# Patient Record
Sex: MALE | Race: OTHER | NOT HISPANIC OR LATINO | Employment: OTHER | URBAN - METROPOLITAN AREA
[De-identification: names, ages, dates, MRNs, and addresses within clinical notes are randomized per-mention and may not be internally consistent; named-entity substitution may affect disease eponyms.]

---

## 2017-02-09 ENCOUNTER — OFFICE VISIT (OUTPATIENT)
Dept: MEDICAL GROUP | Facility: PHYSICIAN GROUP | Age: 72
End: 2017-02-09
Payer: MEDICARE

## 2017-02-09 VITALS
BODY MASS INDEX: 28.53 KG/M2 | HEIGHT: 63 IN | DIASTOLIC BLOOD PRESSURE: 80 MMHG | HEART RATE: 88 BPM | SYSTOLIC BLOOD PRESSURE: 140 MMHG | TEMPERATURE: 98.4 F | WEIGHT: 161 LBS | OXYGEN SATURATION: 90 %

## 2017-02-09 DIAGNOSIS — R53.83 OTHER FATIGUE: ICD-10-CM

## 2017-02-09 DIAGNOSIS — I25.2 OLD MYOCARDIAL INFARCTION: ICD-10-CM

## 2017-02-09 DIAGNOSIS — L21.9 SEBORRHEIC DERMATITIS: ICD-10-CM

## 2017-02-09 DIAGNOSIS — K80.20 ASYMPTOMATIC GALLSTONES: ICD-10-CM

## 2017-02-09 DIAGNOSIS — D45 POLYCYTHEMIA RUBRA VERA (HCC): ICD-10-CM

## 2017-02-09 DIAGNOSIS — K76.0 FATTY LIVER: ICD-10-CM

## 2017-02-09 DIAGNOSIS — R91.1 PULMONARY NODULE SEEN ON IMAGING STUDY: ICD-10-CM

## 2017-02-09 DIAGNOSIS — Z00.00 MEDICARE ANNUAL WELLNESS VISIT, SUBSEQUENT: ICD-10-CM

## 2017-02-09 DIAGNOSIS — I11.9 BENIGN HYPERTENSIVE HEART DISEASE WITHOUT HEART FAILURE: ICD-10-CM

## 2017-02-09 DIAGNOSIS — I10 ESSENTIAL HYPERTENSION: Chronic | ICD-10-CM

## 2017-02-09 DIAGNOSIS — R94.31: ICD-10-CM

## 2017-02-09 DIAGNOSIS — K63.5 COLON POLYPS: ICD-10-CM

## 2017-02-09 PROCEDURE — 1036F TOBACCO NON-USER: CPT | Performed by: NURSE PRACTITIONER

## 2017-02-09 PROCEDURE — G0439 PPPS, SUBSEQ VISIT: HCPCS | Performed by: NURSE PRACTITIONER

## 2017-02-09 PROCEDURE — G8510 SCR DEP NEG, NO PLAN REQD: HCPCS | Performed by: NURSE PRACTITIONER

## 2017-02-09 ASSESSMENT — PATIENT HEALTH QUESTIONNAIRE - PHQ9: CLINICAL INTERPRETATION OF PHQ2 SCORE: 2

## 2017-02-09 NOTE — ASSESSMENT & PLAN NOTE
1/2016    AST(SGOT) 18 12 - 45 U/L Final      ALT(SGPT) 28 2 - 50 U/L Final     Alkaline Phosphatase 50 30 - 99 U/L Final     Followed by Herminio Hernandez M.D..

## 2017-02-09 NOTE — PROGRESS NOTES
CC:   Medicare Annual Wellness Visit    HPI:  Alonso is a 72 y.o. here for Medicare Annual Wellness Visit    Patient Active Problem List    Diagnosis Date Noted   • Chronic ST-segment elevation, probably repolarization variant 05/20/2014     Priority: Low   • Old myocardial infarction, presumed vasospastic with no coronary arterial obstruction, 2002 05/20/2014     Priority: Low   • Other fatigue 10/12/2015   • Pulmonary nodule seen on imaging study 01/07/2015   • Polycythemia rubra vera (CMS-HCC) 01/07/2015   • Benign hypertensive heart disease without heart failure 05/20/2014   • Seborrheic dermatitis 04/16/2014   • Dementia 04/16/2014   • Hypertension 04/16/2014   • Hyperlipidemia 04/16/2014   • Fatty liver 04/16/2014   • Colon polyps 04/16/2014   • Asymptomatic gallstones 04/16/2014   • Diverticulosis 04/16/2014     Current Outpatient Prescriptions   Medication Sig Dispense Refill   • Calcium-Vitamin D (CALTRATE 600 PLUS-VIT D PO) Take  by mouth.     • colchicine (COLCRYS) 0.6 MG Tab Take 1 Tab by mouth every day. 9 Tab 1   • Multiple Vitamins-Minerals (CENTRUM SILVER ADULT 50+ PO) Take  by mouth.     • ketoconazole (NIZORAL) 2 % Cream Use BID 60 g 1   • vitamin e (VITAMIN E) 400 UNIT Cap Take 400 Units by mouth every day.     • Cholecalciferol (VITAMIN D PO) Take  by mouth.     • lisinopril (PRINIVIL) 20 MG TABS Take 20 mg by mouth every day.     • amlodipine (NORVASC) 10 MG TABS Take 0.5 Tabs by mouth every day. 30 Tab 11   • lovastatin (MEVACOR) 20 MG TABS Take 1 Tab by mouth every day. 30 Tab 3   • aspirin (ASA) 325 MG TABS Take 325 mg by mouth every 6 hours as needed.       No current facility-administered medications for this visit.      Current supplements: no  Chronic narcotic pain medicines: no  Allergies: Vytorin  Exercise: yes  Current social contact/activities: Has support of family and friends      Screening:  Depression Screening    Little interest or pleasure in doing things?     Feeling down,  depressed , or hopeless?    Trouble falling or staying asleep, or sleeping too much?     Feeling tired or having little energy?     Poor appetite or overeating?     Feeling bad about yourself - or that you are a failure or have let yourself or your family down?    Trouble concentrating on things, such as reading the newspaper or watching television?    Moving or speaking so slowly that other people could have noticed.  Or the opposite - being so fidgety or restless that you have been moving around a lot more than usual?     Thoughts that you would be better off dead, or of hurting yourself?     Patient Health Questionnaire Score:      If depressive symptoms identified deferred to follow up visit unless specifically addressed in assesment and plan.      Screening for Cognitive Impairment    Three Minute Recall (banana, sunrise, fence)   /3    Draw clock face with all 12 numbers set to the hand to show 10 minures past 11 o'clock       Cognitive concerns identified defferred for follow up unless specifically addressed in assesment and plan.    Fall Risk Assessment    Has the patient had two or more falls in the last year or any fall with injury in the last year?       Safety Assessment    Throw rugs on floor.     Handrails on all stairs.     Good lighting in all hallways.     Difficulty hearing.     Patient counseled about all safety risks that were identified.    Functional Assessment ADLs    Are there any barriers preventing you from cooking for yourself or meeting nutritional needs?   .    Are there any barriers preventing you from driving safely or obtaining transportation?   .    Are there any barriers preventing you from using a telephone or calling for help?   .    Are there any barriers preventing you from shopping?   .    Are there any barriers preventing you from taking care of your own finances?   .    Are there any barriers preventing you from managing your medications?   .    Are currently engaing any  "exercise or physical activity?   .       Health Maintenance Summary                IMM ZOSTER VACCINE Overdue 1/21/2006     IMM PNEUMOCOCCAL 65+ (ADULT) LOW/MEDIUM RISK SERIES Overdue 1/21/2011      Done 10/10/2008 Imm Admin: Pneumococcal polysaccharide vaccine (PPSV-23)    Annual Wellness Visit Overdue 8/14/2015      Done 8/13/2014     IMM DTaP/Tdap/Td Vaccine Next Due 4/28/2024      Done 4/28/2014 Imm Admin: Tdap Vaccine    COLONOSCOPY Next Due 5/20/2024      Done 5/20/2014 AMB REFERRAL TO GI FOR COLONOSCOPY          Patient Care Team:  Herminio Hernandez M.D. as PCP - General (Family Medicine)  SUSAN Rajput as Consulting Physician (Family Medicine)  [facility] ([facility]) as Consulting Physician  Lester Lima M.D. as Consulting Physician (Gastroenterology)  [facility] as Consulting Physician        Social History   Substance Use Topics   • Smoking status: Former Smoker -- 1.50 packs/day for 45 years     Types: Cigarettes     Quit date: 04/09/2002   • Smokeless tobacco: Never Used   • Alcohol Use: 0.0 oz/week     0 Standard drinks or equivalent per week      Comment: occasional beer       Family History   Problem Relation Age of Onset   • Hypertension Mother    • Hypertension Father      He  has a past medical history of Hypertension; CAD (coronary artery disease); Dementia; MI, old; GI bleed; CAD (coronary artery disease); Diverticulitis; and Hyperlipidemia.   History reviewed. No pertinent past surgical history.    ROS:    Ostomy or other tubes or amputations: no  Chronic oxygen use no  Last eye exam 2016  : Denies incontinence.  Gait: Uses no assistive device Hearing good.    Dentition good    Exam: Blood pressure 140/80, pulse 88, temperature 36.9 °C (98.4 °F), height 1.6 m (5' 2.99\"), weight 73.029 kg (161 lb), SpO2 90 %. Body mass index is 28.53 kg/(m^2).  Alert, oriented in no acute distress.  Eye contact is good, speech goal directed, affect " calm      Assessment and Plan. The following treatment and monitoring plan is recommended for all problems as listed below:   Hypertension  /80  Chronic condition managed with current medical regimen  Stable per review   Continue with current meds  Followed by Herminio Hernandez M.D..        Hyperlipidemia  Chronic condition managed with current medical regimen  Stable per review   Continue with current meds  Followed by Herminio Hernandez M.D..        Seborrheic dermatitis  Chronic condition managed with current medical regimen  Stable per review   Continue with current meds prn  Followed by Herminio Hernandez M.D..        Fatty liver  1/2016    AST(SGOT) 18 12 - 45 U/L Final      ALT(SGPT) 28 2 - 50 U/L Final     Alkaline Phosphatase 50 30 - 99 U/L Final     Followed by Herminio Hernandez M.D..     Colon polyps  Colonoscopy done 4/2015, no polyps, has hemorrhoids with hx of 3 prior bandings    Asymptomatic gallstones  Chronic condition managed with current medical regimen  Stable per review  Denies abd pain, n/v  Followed by GI and Herminio Hernandez M.D..        Diverticulosis  Chronic condition managed with current medical regimen  Stable per review  Denies abd pain  Followed by GI and Herminio Hernandez M.D..        Benign hypertensive heart disease without heart failure  Followed by Hemrinio Hernandez M.D. q 6 months  Denies cardiac sxs  Continue with current medications     Pulmonary nodule seen on imaging study  Chronic condition managed with current medical regimen  Stable per review  No changes   Followed by Herminio Hernandez M.D..        Polycythemia rubra vera  Chronic condition managed with current medical regimen of blood donation q 3 months  Stable per review  Followed by Herminio Hernandez M.D..        Chronic ST-segment elevation, probably repolarization variant  Followed by Herminio Hernandez M.D.  q 6 months  Denies cardiac sxs  Continue with current medications      Old myocardial  infarction, presumed vasospastic with no coronary arterial obstruction, 2002  Followed by Herminio Hernandez M.D.  q 6 months  Denies cardiac sxs  Continue with current medications     Dementia  Per wife, occurrence following cardiac event when pt sustained low O2  Unchanged, pt no longer drives, not allowed to cook, home alone for short time  Non verbal today by choice    Other fatigue  Per wife sleeps most of day  Tires easily with activity, not so in the past, wife will begin to take pt for walks when weather improves        Health Care Screening recommendations reviewed with patient today: per Patient Instructions  DPA/Advanced directive: .has NO advanced directive - not interested in additional information    Next office visit for recheck of chronic medical conditions is due with Herminio Hernandez M.D. in 4-6 months    States pneumonia vaccines received

## 2017-02-09 NOTE — ASSESSMENT & PLAN NOTE
Per wife sleeps most of day  Tires easily with activity, not so in the past, wife will begin to take pt for walks when weather improves

## 2017-02-09 NOTE — ASSESSMENT & PLAN NOTE
Followed by Herminio Hernandez M.D.  q 6 months  Denies cardiac sxs  Continue with current medications

## 2017-02-09 NOTE — ASSESSMENT & PLAN NOTE
Chronic condition managed with current medical regimen  Stable per review  Denies abd pain  Followed by [facility] and Herminio Hernandez M.D..

## 2017-02-09 NOTE — ASSESSMENT & PLAN NOTE
Chronic condition managed with current medical regimen of blood donation q 3 months  Stable per review  Followed by Herminio Hernandez M.D..

## 2017-02-09 NOTE — ASSESSMENT & PLAN NOTE
/80  Chronic condition managed with current medical regimen  Stable per review   Continue with current meds  Followed by Herminio Hernandez M.D..

## 2017-02-09 NOTE — ASSESSMENT & PLAN NOTE
Chronic condition managed with current medical regimen  Stable per review   Continue with current meds prn  Followed by Herminio Hernandez M.D..

## 2017-02-09 NOTE — ASSESSMENT & PLAN NOTE
Per wife, occurrence following cardiac event when pt sustained low O2  Unchanged, pt no longer drives, not allowed to cook, home alone for short time  Non verbal today by choice

## 2017-02-09 NOTE — ASSESSMENT & PLAN NOTE
Chronic condition managed with current medical regimen  Stable per review  Denies abd pain, n/v  Followed by [facility] and Herminio Hernandez M.D..

## 2017-02-09 NOTE — ASSESSMENT & PLAN NOTE
Chronic condition managed with current medical regimen  Stable per review   Continue with current meds  Followed by Herminio Hernandez M.D..

## 2017-02-09 NOTE — PATIENT INSTRUCTIONS
Continue with care through Herminio Hernandez M.D..  Next Medicare Annual Wellness Visit is due in one year.    Continue care with specialists you are seeing for your chronic problems.    Attached is some preventative information for you to read.          Fall Prevention and Home Safety  Falls cause injuries and can affect all age groups. It is possible to prevent falls.   HOW TO PREVENT FALLS  · Wear shoes with rubber soles that do not have an opening for your toes.   · Keep the inside and outside of your house well lit.   · Use night lights throughout your home.   · Remove clutter from floors.   · Clean up floor spills.   · Remove throw rugs or fasten them to the floor with carpet tape.   · Do not place electrical cords across pathways.   · Put grab bars by your tub, shower, and toilet. Do not use towel bars as grab bars.   · Put handrails on both sides of the stairway. Fix loose handrails.   · Do not climb on stools or stepladders, if possible.   · Do not wax your floors.   · Repair uneven or unsafe sidewalks, walkways, or stairs.   · Keep items you use a lot within reach.   · Be aware of pets.   · Keep emergency numbers next to the telephone.   · Put smoke detectors in your home and near bedrooms.   Ask your doctor what other things you can do to prevent falls.  Document Released: 10/14/2010 Document Revised: 06/18/2013 Document Reviewed: 03/19/2013  ExitCare® Patient Information ©2013 [email or web address].    Exercise to Stay Healthy      Exercise helps you become and stay healthy.  EXERCISE IDEAS AND TIPS  Choose exercises that:  · You enjoy.   · Fit into your day.   You do not need to exercise really hard to be healthy. You can do exercises at a slow or medium level and stay healthy. You can:  · Stretch before and after working out.   · Try yoga, Pilates, or ramesh chi.   · Lift weights.   · Walk fast, swim, jog, run, climb stairs, bicycle, dance, or rollerskate.   · Take aerobic classes.   Exercises that burn about  150 calories:  · Running 1 ½ miles in 15 minutes.   · Playing volleyball for 45 to 60 minutes.   · Washing and waxing a car for 45 to 60 minutes.   · Playing touch football for 45 minutes.   · Walking 1 ¾ miles in 35 minutes.   · Pushing a stroller 1 ½ miles in 30 minutes.   · Playing basketball for 30 minutes.   · Raking leaves for 30 minutes.   · Bicycling 5 miles in 30 minutes.   · Walking 2 miles in 30 minutes.   · Dancing for 30 minutes.   · Shoveling snow for 15 minutes.   · Swimming laps for 20 minutes.   · Walking up stairs for 15 minutes.   · Bicycling 4 miles in 15 minutes.   · Gardening for 30 to 45 minutes.   · Jumping rope for 15 minutes.   · Washing windows or floors for 45 to 60 minutes.     One suggestion is to start walking for 10 minutes after each meal.  This will help with digestion and help you to metabolize your meal.       For Weight Loss -   Recommend portion sizes with more fruits/vegetables/high fiber foods.  Stay away from white bread/pastas/white rice/white potatoes.  Increase Fluid intake to 6-8 glasses of water daily.   Eliminate soda's (diet and regular) from your fluid intake.      Document Released: 01/20/2012 Document Revised: 03/11/2013 Document Reviewed: 01/20/2012  ExitCare® Patient Information ©2013 [employer].    Fat and Cholesterol Control Diet  Cholesterol is a wax-like substance. It comes from your liver and is found in certain foods. There is good (HDL) and bad (LDL) cholesterol. Too much cholesterol in your blood can affect your heart. Certain foods can lower or raise your cholesterol. Eat foods that are low in cholesterol.  Saturated and trans fats are bad fats found in foods that will raise your cholesterol. Do not eat foods that are high in saturated and trans fats.  FOODS HIGHER IN SATURATED AND TRANS FATS  · Dairy products, such as whole milk, eggs, cheese, cream, and butter.   · Fatty meats, such as hot dogs, sausage, and salami.   · Fried foods.   · Trans fats  which are found in margarine and pre-made cookies, crackers, and baked goods.   · Tropical oils, such as coconut and palm oils.   Read package labels at the store. Do not buy products that use saturated or trans fats or hydrogenated oils. Find foods labeled:  · Low-fat.   · Low-saturated fat.   · Trans-fat-free.   · Low-cholesterol.   FOODS LOWER IN CHOLESTEROL  ·  Fruit.   · Vegetables.   · Beans, peas, and lentils.   · Fish.   · Lean meat, such as chicken (without skin) or ground turkey.   · Grains, such as barley, rice, couscous, bulgur wheat, and pasta.   · Heart-healthy tub margarine.   PREPARING YOUR FOOD  · Broil, bake, steam, or roast foods. Do not anand food.   · Use non-stick cooking sprays.   · Use lemon or herbs to flavor food instead of using butter or stick margarine.   · Use nonfat yogurt, salsa, or low-fat dressings for salads.   Document Released: 06/18/2013 Document Reviewed: 06/18/2013  ExitCare® Patient Information ©2013 [employer].    Recommend annual flu vaccine.  Next due in Fall, 2015.  If you decide not to have the flu vaccine, recommend good handwashing and staying out of crowds during flu season.

## 2017-02-09 NOTE — MR AVS SNAPSHOT
"        Alonso Chang    2017 3:00 PM   Office Visit   MRN: [id number]    Department:  [facility]   Dept Phone:  [phone]    Description:  Male : 1945   Provider:  AGUS Kiran           Reason for Visit     Annual Exam subsequent      Allergies as of 2017     Allergen Noted Reactions    Vytorin 2015   Unspecified    [facility] Vytorin      You were diagnosed with     Essential hypertension   [7803663]       Seborrheic dermatitis   [1117700]       Fatty liver   [077454]       Colon polyps   [956620]       Asymptomatic gallstones   [511318]       Benign hypertensive heart disease without heart failure   [402.10.ICD-9-CM]       Pulmonary nodule seen on imaging study   [506220]       Polycythemia rubra vera (CMS-HCC)   [163539]       ST-segment elevation   [607240]       Old myocardial infarction   [412.ICD-9-CM]         Vital Signs     Blood Pressure Pulse Temperature Height Weight Body Mass Index    164/102 mmHg 109 36.9 °C (98.4 °F) 1.6 m (5' 2.99\") 73.029 kg (161 lb) 28.53 kg/m2    Oxygen Saturation Smoking Status                90% Former Smoker          Basic Information     Date Of Birth Sex Race Ethnicity Preferred Language    1945 Male , Other Non- English      Problem List              ICD-10-CM Priority Class Noted - Resolved    Seborrheic dermatitis L21.9   2014 - Present    Dementia (Chronic) F03.90   2014 - Present    Hypertension (Chronic) I10   2014 - Present    Hyperlipidemia (Chronic) E78.5   2014 - Present    Fatty liver K76.0   2014 - Present    Colon polyps K63.5   2014 - Present    Asymptomatic gallstones K80.20   2014 - Present    Diverticulosis K57.90   2014 - Present    Chronic ST-segment elevation, probably repolarization variant R94.31 Low  2014 - Present    Old myocardial infarction, presumed vasospastic with no coronary arterial obstruction,  I25.2 Low  " 5/20/2014 - Present    Benign hypertensive heart disease without heart failure I11.9   5/20/2014 - Present    Pulmonary nodule seen on imaging study R91.1   1/7/2015 - Present    Polycythemia rubra vera (CMS-[facility]) D45   1/7/2015 - Present    Other fatigue R53.83   10/12/2015 - Present      Health Maintenance        Date Due Completion Dates    IMM ZOSTER VACCINE 1/21/2006 ---    IMM PNEUMOCOCCAL 65+ (ADULT) LOW/MEDIUM RISK SERIES (1 of 2 - PCV13) 1/21/2011 10/10/2008    IMM DTaP/Tdap/Td Vaccine (2 - Td) 4/28/2024 4/28/2014    COLONOSCOPY 5/20/2024 5/20/2014            Current Immunizations     INFLUENZA VACCINE H1N1 10/20/2013, 10/20/2013    Influenza TIV (IM) 12/2/2015, 12/5/2014    Influenza Vaccine Adult HD 11/9/2016, 11/9/2016    Pneumococcal Vaccine (UF)Historical Data 10/20/2011, 10/20/2011    Pneumococcal polysaccharide vaccine (PPSV-23) 10/10/2008    Tdap Vaccine 4/28/2014      Below and/or attached are the medications your provider expects you to take. Review all of your home medications and newly ordered medications with your provider and/or pharmacist. Follow medication instructions as directed by your provider and/or pharmacist. Please keep your medication list with you and share with your provider. Update the information when medications are discontinued, doses are changed, or new medications (including over-the-counter products) are added; and carry medication information at all times in the event of emergency situations     Allergies:  VYTORIN - Unspecified               Medications  Valid as of: February 09, 2017 -  3:06 PM    Generic Name Brand Name Tablet Size Instructions for use    AmLODIPine Besylate (Tab) NORVASC 10 MG Take 0.5 Tabs by mouth every day.        Aspirin (Tab)  MG Take 325 mg by mouth every 6 hours as needed.        Cholecalciferol   Take  by mouth.        Colchicine (Tab) COLCRYS 0.6 MG Take 1 Tab by mouth every day.        Ketoconazole (Cream) NIZORAL 2 % Use BID         Lisinopril (Tab) PRINIVIL 20 MG Take 20 mg by mouth every day.        Lovastatin (Tab) MEVACOR 20 MG Take 1 Tab by mouth every day.        MethylPREDNISolone (Tablet Therapy Pack) MEDROL DOSEPAK 4 MG U.D.        Multiple Vitamins-Minerals   Take  by mouth.        Vitamin E (Cap) VITAMIN E 400 UNIT Take 400 Units by mouth every day.        .                 Medicines prescribed today were sent to:     [address] (), [place] - [address]    [address] () [place] [address]    Phone: [phone] Fax: [phone]    Open 24 Hours?: No      Medication refill instructions:       If your prescription bottle indicates you have medication refills left, it is not necessary to call your provider’s office. Please contact your pharmacy and they will refill your medication.    If your prescription bottle indicates you do not have any refills left, you may request refills at any time through one of the following ways: The online [email or web address] system (except Urgent Care), by calling your provider’s office, or by asking your pharmacy to contact your provider’s office with a refill request. Medication refills are processed only during regular business hours and may not be available until the next business day. Your provider may request additional information or to have a follow-up visit with you prior to refilling your medication.   *Please Note: Medication refills are assigned a new Rx number when refilled electronically. Your pharmacy may indicate that no refills were authorized even though a new prescription for the same medication is available at the pharmacy. Please request the medicine by name with the pharmacy before contacting your provider for a refill.        Instructions    Continue with care through Herminio Hernandez M.D..  Next Medicare Annual Wellness Visit is due in one year.    Continue care with specialists you are seeing for your chronic problems.    Attached is some preventative  information for you to read.          Fall Prevention and Home Safety  Falls cause injuries and can affect all age groups. It is possible to prevent falls.   HOW TO PREVENT FALLS  · Wear shoes with rubber soles that do not have an opening for your toes.   · Keep the inside and outside of your house well lit.   · Use night lights throughout your home.   · Remove clutter from floors.   · Clean up floor spills.   · Remove throw rugs or fasten them to the floor with carpet tape.   · Do not place electrical cords across pathways.   · Put grab bars by your tub, shower, and toilet. Do not use towel bars as grab bars.   · Put handrails on both sides of the stairway. Fix loose handrails.   · Do not climb on stools or stepladders, if possible.   · Do not wax your floors.   · Repair uneven or unsafe sidewalks, walkways, or stairs.   · Keep items you use a lot within reach.   · Be aware of pets.   · Keep emergency numbers next to the telephone.   · Put smoke detectors in your home and near bedrooms.   Ask your doctor what other things you can do to prevent falls.  Document Released: 10/14/2010 Document Revised: 06/18/2013 Document Reviewed: 03/19/2013  ExitCare® Patient Information ©2013 [employer].    Exercise to Stay Healthy      Exercise helps you become and stay healthy.  EXERCISE IDEAS AND TIPS  Choose exercises that:  · You enjoy.   · Fit into your day.   You do not need to exercise really hard to be healthy. You can do exercises at a slow or medium level and stay healthy. You can:  · Stretch before and after working out.   · Try yoga, Pilates, or ramesh chi.   · Lift weights.   · Walk fast, swim, jog, run, climb stairs, bicycle, dance, or rollerskate.   · Take aerobic classes.   Exercises that burn about 150 calories:  · Running 1 ½ miles in 15 minutes.   · Playing volleyball for 45 to 60 minutes.   · Washing and waxing a car for 45 to 60 minutes.   · Playing touch football for 45 minutes.   · Walking 1 ¾ miles in 35  minutes.   · Pushing a stroller 1 ½ miles in 30 minutes.   · Playing basketball for 30 minutes.   · Raking leaves for 30 minutes.   · Bicycling 5 miles in 30 minutes.   · Walking 2 miles in 30 minutes.   · Dancing for 30 minutes.   · Shoveling snow for 15 minutes.   · Swimming laps for 20 minutes.   · Walking up stairs for 15 minutes.   · Bicycling 4 miles in 15 minutes.   · Gardening for 30 to 45 minutes.   · Jumping rope for 15 minutes.   · Washing windows or floors for 45 to 60 minutes.     One suggestion is to start walking for 10 minutes after each meal.  This will help with digestion and help you to metabolize your meal.       For Weight Loss -   Recommend portion sizes with more fruits/vegetables/high fiber foods.  Stay away from white bread/pastas/white rice/white potatoes.  Increase Fluid intake to 6-8 glasses of water daily.   Eliminate soda's (diet and regular) from your fluid intake.      Document Released: 01/20/2012 Document Revised: 03/11/2013 Document Reviewed: 01/20/2012  ExitCare® Patient Information ©2013 [employer].    Fat and Cholesterol Control Diet  Cholesterol is a wax-like substance. It comes from your liver and is found in certain foods. There is good (HDL) and bad (LDL) cholesterol. Too much cholesterol in your blood can affect your heart. Certain foods can lower or raise your cholesterol. Eat foods that are low in cholesterol.  Saturated and trans fats are bad fats found in foods that will raise your cholesterol. Do not eat foods that are high in saturated and trans fats.  FOODS HIGHER IN SATURATED AND TRANS FATS  · Dairy products, such as whole milk, eggs, cheese, cream, and butter.   · Fatty meats, such as hot dogs, sausage, and salami.   · Fried foods.   · Trans fats which are found in margarine and pre-made cookies, crackers, and baked goods.   · Tropical oils, such as coconut and palm oils.   Read package labels at the store. Do not buy products that use saturated or trans fats or  hydrogenated oils. Find foods labeled:  · Low-fat.   · Low-saturated fat.   · Trans-fat-free.   · Low-cholesterol.   FOODS LOWER IN CHOLESTEROL  ·  Fruit.   · Vegetables.   · Beans, peas, and lentils.   · Fish.   · Lean meat, such as chicken (without skin) or ground turkey.   · Grains, such as barley, rice, couscous, bulgur wheat, and pasta.   · Heart-healthy tub margarine.   PREPARING YOUR FOOD  · Broil, bake, steam, or roast foods. Do not anand food.   · Use non-stick cooking sprays.   · Use lemon or herbs to flavor food instead of using butter or stick margarine.   · Use nonfat yogurt, salsa, or low-fat dressings for salads.   Document Released: 06/18/2013 Document Reviewed: 06/18/2013  ExitCare® Patient Information ©2013 [employer].    Recommend annual flu vaccine.  Next due in Fall, 2015.  If you decide not to have the flu vaccine, recommend good handwashing and staying out of crowds during flu season.                  MyChart Status: Patient Declined

## 2017-02-09 NOTE — ASSESSMENT & PLAN NOTE
Chronic condition managed with current medical regimen  Stable per review  No changes   Followed by Herminio Hernandez M.D..

## 2017-10-25 ENCOUNTER — OFFICE VISIT (OUTPATIENT)
Dept: MEDICAL GROUP | Facility: PHYSICIAN GROUP | Age: 72
End: 2017-10-25
Payer: MEDICARE

## 2017-10-25 VITALS
DIASTOLIC BLOOD PRESSURE: 78 MMHG | SYSTOLIC BLOOD PRESSURE: 148 MMHG | TEMPERATURE: 98 F | RESPIRATION RATE: 18 BRPM | HEART RATE: 97 BPM | HEIGHT: 63 IN | OXYGEN SATURATION: 90 % | BODY MASS INDEX: 28 KG/M2 | WEIGHT: 158 LBS

## 2017-10-25 DIAGNOSIS — E78.00 PURE HYPERCHOLESTEROLEMIA: ICD-10-CM

## 2017-10-25 DIAGNOSIS — I10 ESSENTIAL HYPERTENSION: ICD-10-CM

## 2017-10-25 DIAGNOSIS — I25.2 OLD MYOCARDIAL INFARCTION: ICD-10-CM

## 2017-10-25 DIAGNOSIS — D45 POLYCYTHEMIA RUBRA VERA (HCC): ICD-10-CM

## 2017-10-25 PROCEDURE — 99214 OFFICE O/P EST MOD 30 MIN: CPT | Performed by: FAMILY MEDICINE

## 2017-10-25 ASSESSMENT — PAIN SCALES - GENERAL: PAINLEVEL: NO PAIN

## 2017-10-25 NOTE — LETTER
[facility]  Lilo Luu M.D.  [address]  [place] [address]  Fax: [phone]   Authorization for Release/Disclosure of   Protected Health Information   Name: ARTEMIO CHANG JR. : 1945 SSN: [id number]   Address: [facility] [address]  [place] [address] Phone:    [phone] (home)    I authorize the entity listed below to release/disclose the PHI below to:   [facility]/Lilo Luu M.D. and Lilo Luu M.D.   Provider or Entity Name:  Dr. Wilkinson  [facility]   Address   City, [facility]   Phone:      Fax:     Reason for request: continuity of care   Information to be released:    [  ] LAST COLONOSCOPY,  including any PATH REPORT and follow-up  [  ] LAST FIT/COLOGUARD RESULT [  ] LAST DEXA  [  ] LAST MAMMOGRAM  [  ] LAST PAP  [  ] LAST LABS [  ] RETINA EXAM REPORT  [  ] IMMUNIZATION RECORDS  [ x ] Release all info: Last two office notes      [  ] Check here and initial the line next to each item to release ALL health information INCLUDING  _____ Care and treatment for drug and / or alcohol abuse  _____ HIV testing, infection status, or AIDS  _____ Genetic Testing    DATES OF SERVICE OR TIME PERIOD TO BE DISCLOSED: _____________  I understand and acknowledge that:  * This Authorization may be revoked at any time by you in writing, except if your health information has already been used or disclosed.  * Your health information that will be used or disclosed as a result of you signing this authorization could be re-disclosed by the recipient. If this occurs, your re-disclosed health information may no longer be protected by State or Federal laws.  * You may refuse to sign this Authorization. Your refusal will not affect your ability to obtain treatment.  * This Authorization becomes effective upon signing and will  on (date) __________.      If no date is indicated, this Authorization will  one (1) year from the signature date.    Name: Artemio Chang  .    Signature:   Date:     10/25/2017       PLEASE FAX REQUESTED RECORDS BACK TO: [phone]

## 2017-10-26 NOTE — PROGRESS NOTES
Alonso Chang Jr. is a 72 y.o. male here to establish care and discuss blood pressure and oxygen saturation.    HPI:  Alonso is a pleasant 72-year-old male here to establish care. He is accompanied by his wife, Johanny. He was born in the [facility] and is a [employer] .    Polycythemia rubra vera  This is a chronic and stable issue for the patient. [facility] Has therapeutic phlebotomy every 8-12 weeks. His oxygen saturation is borderline low today at 90%. Patient's wife tells me that he has been evaluated for oxygen need, but as they live in a studio apartment and the kitchen is close by, it would be unsafe.    Essential hypertension  Stable. Monitoring BP at home. Patient's wife tells me his blood pressure was elevated at the GI office. Currently taking lisinopril 20mg daily and amlodipine 5mg daily as directed. Also taking baby aspirin. Denies lightheadedness, vision changes, headache, palpitations or leg swelling.    Pure hypercholesterolemia  Doing well. Reviewed labs with the patient. Taking medications as prescribed. No myalgias.    Old myocardial infarction, presumed vasospastic with no coronary arterial obstruction, 2002  Patient has known history of previous heart attack. He denies any cardiac symptoms today. Taking medications as prescribed without side effects.      Current medicines (including changes today)  Current Outpatient Prescriptions   Medication Sig Dispense Refill   • Calcium-Vitamin D (CALTRATE 600 PLUS-VIT D PO) Take  by mouth.     • colchicine (COLCRYS) 0.6 MG Tab Take 1 Tab by mouth every day. 9 Tab 1   • Multiple Vitamins-Minerals (CENTRUM SILVER ADULT 50+ PO) Take  by mouth.     • ketoconazole (NIZORAL) 2 % Cream Use BID 60 g 1   • vitamin e (VITAMIN E) 400 UNIT Cap Take 400 Units by mouth every day.     • Cholecalciferol (VITAMIN D PO) Take  by mouth.     • lisinopril (PRINIVIL) 20 MG TABS Take 20 mg by mouth every day.     • amlodipine (NORVASC) 10  "MG TABS Take 0.5 Tabs by mouth every day. 30 Tab 11   • lovastatin (MEVACOR) 20 MG TABS Take 1 Tab by mouth every day. 30 Tab 3   • aspirin (ASA) 325 MG TABS Take 325 mg by mouth every 6 hours as needed.       No current facility-administered medications for this visit.      He  has a past medical history of CAD (coronary artery disease); CAD (coronary artery disease); Dementia; Diverticulitis; GI bleed; Hyperlipidemia; Hypertension; and MI, old.  He  has no past surgical history on file.  Social History   Substance Use Topics   • Smoking status: Former Smoker     Packs/day: 1.50     Years: 45.00     Types: Cigarettes     Quit date: 2002   • Smokeless tobacco: Never Used   • Alcohol use 0.0 oz/week      Comment: occasional beer     Social History     Social History Narrative    ** Merged History Encounter **          Family History   Problem Relation Age of Onset   • Hypertension Mother    • Hypertension Father      Family Status   Relation Status   • Mother  at age 65   • Father  at age 102   • Brother  at age 55       ROS  Constitutional: Negative for fever, chills and malaise/fatigue.   HENT: Negative for congestion.    Eyes: Negative for pain.   Respiratory: Negative for cough and shortness of breath.    Cardiovascular: Negative for leg swelling.   Gastrointestinal: Negative for nausea, vomiting, abdominal pain and diarrhea.   Genitourinary: Negative for dysuria and hematuria.   Skin: Negative for rash.   Neurological: Negative for dizziness, focal weakness and headaches.   Endo/Heme/Allergies: Does not bruise/bleed easily.   Psychiatric/Behavioral: Negative for depression.  The patient is not nervous/anxious.       Objective:     Physical Exam:  Blood pressure 148/78, pulse 97, temperature 36.7 °C (98 °F), resp. rate 18, height 1.6 m (5' 3\"), weight 71.7 kg (158 lb), SpO2 90 %. Body mass index is 27.99 kg/m².  Constitutional: Alert, no distress. Quiet and reserved.  Skin: Warm, dry, " good turgor, no rashes in visible areas.  Eye: Equal, round and reactive, conjunctiva clear, lids normal.  ENMT: Lips without lesions, good dentition, oropharynx clear.  Neck: Trachea midline, no masses, no thyromegaly.  Respiratory: Unlabored respiratory effort, lungs clear to auscultation, no wheezes, no ronchi.  Cardiovascular: Normal S1, S2, no murmur, no edema.  Abdomen: Soft, non-tender, no masses, no hepatosplenomegaly.  Psych: Alert and oriented x3, normal affect and mood.    Assessment and Plan:     1. Polycythemia rubra vera (CMS-HCC)  Chronic and stable. [facility]. He is not a candidate for home O2 therapy as it would be unsafe. Treating with regular therapeutic phlebotomy.    2. Essential hypertension  Well-controlled. Labs as indicated. Continue antihypertensive medications. Discussed decreasing salt intake. Emphasized benefits of exercise and diet. Continue to monitor.    3. Pure hypercholesterolemia  Well controlled. Labs as indicated. Continue statin medication. Continue to monitor.    4. Old myocardial infarction, presumed vasospastic with no coronary arterial obstruction, 2002  Per patient history. Continue current medications. Monitor.    Records reviewed in Epic.  Followup: Return in about 5 months (around 3/25/2018) for Medicare AWV, short.         PLEASE NOTE: This dictation was created using voice recognition software. I have made every reasonable attempt to correct obvious errors, but I expect that there are errors of grammar and possibly content that I did not discover before finalizing the note.

## 2017-10-26 NOTE — ASSESSMENT & PLAN NOTE
Stable. Monitoring BP at home. Currently taking lisinopril 20mg daily and amlodipine 5mg daily as directed. Also taking baby aspirin. Denies lightheadedness, vision changes, headache, palpitations or leg swelling.

## 2017-10-26 NOTE — ASSESSMENT & PLAN NOTE
Patient has known history of previous heart attack. He denies any cardiac symptoms today. Taking medications as prescribed without side effects.

## 2018-03-30 ENCOUNTER — APPOINTMENT (OUTPATIENT)
Dept: MEDICAL GROUP | Facility: PHYSICIAN GROUP | Age: 73
End: 2018-03-30
Payer: MEDICARE

## 2018-04-04 ENCOUNTER — OFFICE VISIT (OUTPATIENT)
Dept: MEDICAL GROUP | Facility: PHYSICIAN GROUP | Age: 73
End: 2018-04-04
Payer: MEDICARE

## 2018-04-04 VITALS
OXYGEN SATURATION: 97 % | HEIGHT: 63 IN | RESPIRATION RATE: 12 BRPM | HEART RATE: 92 BPM | BODY MASS INDEX: 27.96 KG/M2 | WEIGHT: 157.8 LBS | DIASTOLIC BLOOD PRESSURE: 84 MMHG | SYSTOLIC BLOOD PRESSURE: 142 MMHG | TEMPERATURE: 98.9 F

## 2018-04-04 DIAGNOSIS — D45 POLYCYTHEMIA RUBRA VERA (HCC): ICD-10-CM

## 2018-04-04 DIAGNOSIS — I10 ESSENTIAL HYPERTENSION: ICD-10-CM

## 2018-04-04 DIAGNOSIS — J30.1 ACUTE SEASONAL ALLERGIC RHINITIS DUE TO POLLEN: ICD-10-CM

## 2018-04-04 DIAGNOSIS — F03.90 DEMENTIA WITHOUT BEHAVIORAL DISTURBANCE, UNSPECIFIED DEMENTIA TYPE: Chronic | ICD-10-CM

## 2018-04-04 DIAGNOSIS — E78.00 PURE HYPERCHOLESTEROLEMIA: ICD-10-CM

## 2018-04-04 PROCEDURE — 99214 OFFICE O/P EST MOD 30 MIN: CPT | Performed by: FAMILY MEDICINE

## 2018-04-04 ASSESSMENT — PAIN SCALES - GENERAL: PAINLEVEL: NO PAIN

## 2018-04-04 ASSESSMENT — PATIENT HEALTH QUESTIONNAIRE - PHQ9: CLINICAL INTERPRETATION OF PHQ2 SCORE: 0

## 2018-04-04 NOTE — ASSESSMENT & PLAN NOTE
Elevated today. Monitoring BP at home. Currently taking lisinopril 20mg daily and amlodipine 5mg daily as directed. [facility]. Also taking baby aspirin. Denies lightheadedness, vision changes, headache, palpitations or leg swelling.

## 2018-04-04 NOTE — PROGRESS NOTES
Subjective:   Alonso Chang Jr. is a 73 y.o. male here today for follow-up hypertension, PV and dementia. He is accompanied by his wife (Zina).    Essential hypertension  Elevated today. Monitoring BP at home. Currently taking lisinopril 20mg daily and amlodipine 5mg daily as directed. [facility]. Also taking baby aspirin. Denies lightheadedness, vision changes, headache, palpitations or leg swelling.    Polycythemia rubra vera  Recently stopped phlebotomy due to iron loss. [facility].    Pure hypercholesterolemia  Doing well. Reviewed labs with the patient. Taking medications as prescribed. No myalgias.    Dementia  No recent change. He is not currently on any medications.     Towards the end of the visit, patient's wife reported that Alonso has been coughing and clearing his throat a lot. This has been going on for the last couple of weeks. She mentions that he has [facility] that [facility]. They're currently seeing if he needs supplemental oxygen. He denies any fevers, shortness of breath, wheezing or leg swelling.    Current medicines (including changes today)  Current Outpatient Prescriptions   Medication Sig Dispense Refill   • Calcium-Vitamin D (CALTRATE 600 PLUS-VIT D PO) Take  by mouth.     • colchicine (COLCRYS) 0.6 MG Tab Take 1 Tab by mouth every day. 9 Tab 1   • Multiple Vitamins-Minerals (CENTRUM SILVER ADULT 50+ PO) Take  by mouth.     • ketoconazole (NIZORAL) 2 % Cream Use BID 60 g 1   • vitamin e (VITAMIN E) 400 UNIT Cap Take 400 Units by mouth every day.     • Cholecalciferol (VITAMIN D PO) Take  by mouth.     • lisinopril (PRINIVIL) 20 MG TABS Take 20 mg by mouth every day.     • amlodipine (NORVASC) 10 MG TABS Take 0.5 Tabs by mouth every day. 30 Tab 11   • lovastatin (MEVACOR) 20 MG TABS Take 1 Tab by mouth every day. 30 Tab 3   • aspirin (ASA) 325 MG TABS Take 325 mg by mouth every 6 hours as  "needed.       No current facility-administered medications for this visit.      He  has a past medical history of CAD (coronary artery disease); CAD (coronary artery disease); Dementia; Diverticulitis; GI bleed; Hyperlipidemia; Hypertension; and MI, old.    ROS   No chest pain, no shortness of breath, no abdominal pain.     Objective:     Physical Exam:  Blood pressure 142/84, pulse 92, temperature 37.2 °C (98.9 °F), resp. rate 12, height 1.6 m (5' 3\"), weight 71.6 kg (157 lb 12.8 oz), SpO2 97 %. Body mass index is 27.95 kg/m².   Constitutional: Alert, elderly male in no distress.  Skin: Warm, dry, good turgor, no rashes in visible areas.  Eye: Equal, round and reactive, conjunctiva clear, lids normal.  ENMT: Erythematous and edematous nasal turbinates, lips without lesions, good dentition, oropharynx with cobblestoning and post-nasal drip.  Neck: Trachea midline, no masses, no thyromegaly. No cervical or supraclavicular lymphadenopathy.  Respiratory: Unlabored respiratory effort, lungs clear to auscultation, no wheezes, no ronchi.  Cardiovascular: Normal S1, S2, no murmur, no edema.  Abdomen: Soft, non-tender, no masses, no hepatosplenomegaly.  Psych: Alert and oriented x3, flat affect.    Assessment and Plan:     1. Essential hypertension  Elevated today. I recommended increasing one of patient's antihypertensive medications. Patient's wife declines [facility] with the [facility] in regards to this. Encouraged him to continue taking ambulatory blood pressure measurements.    2. Polycythemia rubra vera (CMS-HCC)  Chronic and stable. Patient has stopped having therapeutic phlebotomy due to low iron. He is [facility] up with [facility].    3. Pure hypercholesterolemia  Chronic and stable. Continue current medications. Following with [facility].    4. Dementia without behavioral disturbance, unspecified dementia type  Chronic and stable. Patient's medications at this time. His wife is his " primary caregiver. We'll continue to monitor.    5. Acute seasonal allergic rhinitis due to pollen  This is a relatively new issue for the patient. Advised nasal saline and steroid sprays daily. OTC anti-histamines (Zyrtec) as needed. Encouraged allergen avoidence and environment modification when possible. If regular use not effective, may consider referral to allergist for immunotherapy.    Followup: Return in about 6 months (around 10/4/2018) for Medicare Annual Wellness with health .         PLEASE NOTE: This dictation was created using voice recognition software. I have made every reasonable attempt to correct obvious errors, but I expect that there are errors of grammar and possibly content that I did not discover before finalizing the note.

## 2018-04-18 ENCOUNTER — OFFICE VISIT (OUTPATIENT)
Dept: URGENT CARE | Facility: CLINIC | Age: 73
End: 2018-04-18
Payer: MEDICARE

## 2018-04-18 VITALS
TEMPERATURE: 98.7 F | SYSTOLIC BLOOD PRESSURE: 162 MMHG | RESPIRATION RATE: 20 BRPM | HEIGHT: 64 IN | WEIGHT: 162.48 LBS | DIASTOLIC BLOOD PRESSURE: 98 MMHG | HEART RATE: 102 BPM | BODY MASS INDEX: 27.74 KG/M2 | OXYGEN SATURATION: 88 %

## 2018-04-18 DIAGNOSIS — I10 HYPERTENSION, UNSPECIFIED TYPE: ICD-10-CM

## 2018-04-18 PROCEDURE — 99214 OFFICE O/P EST MOD 30 MIN: CPT | Performed by: PHYSICIAN ASSISTANT

## 2018-04-18 ASSESSMENT — ENCOUNTER SYMPTOMS
EYES NEGATIVE: 1
HYPERTENSION: 1
CARDIOVASCULAR NEGATIVE: 1
CONSTITUTIONAL NEGATIVE: 1
SHORTNESS OF BREATH: 0
RESPIRATORY NEGATIVE: 1
NEUROLOGICAL NEGATIVE: 1

## 2018-10-22 ENCOUNTER — TELEPHONE (OUTPATIENT)
Dept: MEDICAL GROUP | Facility: PHYSICIAN GROUP | Age: 73
End: 2018-10-22

## 2018-10-22 NOTE — TELEPHONE ENCOUNTER
Left message for patient to call back regarding pre-visit planning. Please transfer call to Aubree at [phone]      Pt was seen on 11/02/18

## 2018-11-02 ENCOUNTER — OFFICE VISIT (OUTPATIENT)
Dept: MEDICAL GROUP | Facility: PHYSICIAN GROUP | Age: 73
End: 2018-11-02
Payer: MEDICARE

## 2018-11-02 VITALS
HEIGHT: 64 IN | DIASTOLIC BLOOD PRESSURE: 88 MMHG | TEMPERATURE: 98.1 F | WEIGHT: 165 LBS | BODY MASS INDEX: 28.17 KG/M2 | RESPIRATION RATE: 18 BRPM | OXYGEN SATURATION: 90 % | SYSTOLIC BLOOD PRESSURE: 152 MMHG | HEART RATE: 74 BPM

## 2018-11-02 DIAGNOSIS — J30.1 ACUTE SEASONAL ALLERGIC RHINITIS DUE TO POLLEN: ICD-10-CM

## 2018-11-02 DIAGNOSIS — Z00.00 MEDICARE ANNUAL WELLNESS VISIT, SUBSEQUENT: ICD-10-CM

## 2018-11-02 DIAGNOSIS — K57.30 DIVERTICULOSIS OF LARGE INTESTINE WITHOUT HEMORRHAGE: ICD-10-CM

## 2018-11-02 DIAGNOSIS — R91.1 PULMONARY NODULE SEEN ON IMAGING STUDY: ICD-10-CM

## 2018-11-02 DIAGNOSIS — L21.9 SEBORRHEIC DERMATITIS: ICD-10-CM

## 2018-11-02 DIAGNOSIS — H00.012 HORDEOLUM EXTERNUM OF RIGHT LOWER EYELID: ICD-10-CM

## 2018-11-02 DIAGNOSIS — D45 POLYCYTHEMIA RUBRA VERA (HCC): ICD-10-CM

## 2018-11-02 DIAGNOSIS — J44.9 COPD, SEVERITY TO BE DETERMINED (HCC): ICD-10-CM

## 2018-11-02 DIAGNOSIS — F03.90 DEMENTIA WITHOUT BEHAVIORAL DISTURBANCE, UNSPECIFIED DEMENTIA TYPE: Chronic | ICD-10-CM

## 2018-11-02 DIAGNOSIS — I25.2 OLD MYOCARDIAL INFARCTION: ICD-10-CM

## 2018-11-02 DIAGNOSIS — I10 ESSENTIAL HYPERTENSION: ICD-10-CM

## 2018-11-02 DIAGNOSIS — G47.33 OBSTRUCTIVE SLEEP APNEA: ICD-10-CM

## 2018-11-02 DIAGNOSIS — E78.00 PURE HYPERCHOLESTEROLEMIA: ICD-10-CM

## 2018-11-02 DIAGNOSIS — I11.9 BENIGN HYPERTENSIVE HEART DISEASE WITHOUT HEART FAILURE: ICD-10-CM

## 2018-11-02 DIAGNOSIS — J96.11 CHRONIC RESPIRATORY FAILURE WITH HYPOXIA (HCC): ICD-10-CM

## 2018-11-02 PROCEDURE — G0439 PPPS, SUBSEQ VISIT: HCPCS | Performed by: FAMILY MEDICINE

## 2018-11-02 RX ORDER — BUDESONIDE AND FORMOTEROL FUMARATE DIHYDRATE 160; 4.5 UG/1; UG/1
2 AEROSOL RESPIRATORY (INHALATION) 2 TIMES DAILY
COMMUNITY
End: 2022-05-10

## 2018-11-02 RX ORDER — ALBUTEROL SULFATE 90 UG/1
2 AEROSOL, METERED RESPIRATORY (INHALATION) EVERY 6 HOURS PRN
COMMUNITY
End: 2022-10-21

## 2018-11-02 ASSESSMENT — ACTIVITIES OF DAILY LIVING (ADL): BATHING_REQUIRES_ASSISTANCE: 0

## 2018-11-02 ASSESSMENT — PATIENT HEALTH QUESTIONNAIRE - PHQ9: CLINICAL INTERPRETATION OF PHQ2 SCORE: 0

## 2018-11-02 ASSESSMENT — ENCOUNTER SYMPTOMS: GENERAL WELL-BEING: FAIR

## 2018-11-02 ASSESSMENT — PAIN SCALES - GENERAL: PAINLEVEL: NO PAIN

## 2018-11-02 NOTE — PROGRESS NOTES
"Chief Complaint   Patient presents with   • Annual Wellness Visit       HPI:  Alonso is a 73 y.o. here for Medicare Annual Wellness Visit.  He is accompanied by his wife, Johanny.  His blood pressure is elevated today.  His wife also asks about a \"stye\" on his right eyelid.      Patient Active Problem List    Diagnosis Date Noted   • Old myocardial infarction, presumed vasospastic with no coronary arterial obstruction, 2002 05/20/2014     Priority: Low   • Acute seasonal allergic rhinitis due to pollen 04/04/2018   • Pulmonary nodule seen on imaging study 01/07/2015   • Polycythemia rubra vera (HCC) 01/07/2015   • Benign hypertensive heart disease without heart failure 05/20/2014   • Seborrheic dermatitis 04/16/2014   • Dementia 04/16/2014   • Essential hypertension 04/16/2014   • Pure hypercholesterolemia 04/16/2014   • Diverticulosis 04/16/2014       Current Outpatient Prescriptions   Medication Sig Dispense Refill   • Calcium-Vitamin D (CALTRATE 600 PLUS-VIT D PO) Take  by mouth.     • colchicine (COLCRYS) 0.6 MG Tab Take 1 Tab by mouth every day. 9 Tab 1   • Multiple Vitamins-Minerals (CENTRUM SILVER ADULT 50+ PO) Take  by mouth.     • vitamin e (VITAMIN E) 400 UNIT Cap Take 400 Units by mouth every day.     • Cholecalciferol (VITAMIN D PO) Take  by mouth.     • lisinopril (PRINIVIL) 20 MG TABS Take 20 mg by mouth every day.     • amlodipine (NORVASC) 10 MG TABS Take 0.5 Tabs by mouth every day. 30 Tab 11   • lovastatin (MEVACOR) 20 MG TABS Take 1 Tab by mouth every day. 30 Tab 3   • aspirin (ASA) 325 MG TABS Take 325 mg by mouth every 6 hours as needed.     • ketoconazole (NIZORAL) 2 % Cream Use BID 60 g 1     No current facility-administered medications for this visit.         Patient is taking medications as noted in medication list.  Current supplements as per medication list.     Allergies: Vytorin    Current social contact/activities: Watching TV, spends time with wife     Is patient current with " immunizations? No, due for FLU and SHINGRIX (Shingles). Patient is interested in receiving NONE today.    He  reports that he quit smoking about 16 years ago. His smoking use included Cigarettes. He has a 67.50 pack-year smoking history. He has never used smokeless tobacco. He reports that he drinks alcohol. He reports that he does not use drugs.  Counseling given: Not Answered    DPA/Advanced directive: Patient does not have an Advanced Directive.  A packet and workshop information was given on Advanced Directives.    ROS:    Gait: Uses no assistive device   Ostomy: No   Other tubes: No   Amputations: No   Chronic oxygen use Yes   Last eye exam oct 2018   Wears hearing aids: No   : Denies any urinary leakage during the last 6 months    Depression Screening  Little interest or pleasure in doing things?  0 - not at all  Feeling down, depressed, or hopeless? 0 - not at all  Patient Health Questionnaire Score: 0    If depressive symptoms identified deferred to follow up visit unless specifically addressed in assessment and plan.    Interpretation of PHQ-9 Total Score   Score Severity   1-4 No Depression   5-9 Mild Depression   10-14 Moderate Depression   15-19 Moderately Severe Depression   20-27 Severe Depression    Screening for Cognitive Impairment  Three Minute Recall (leader, season, table)  0/3    Delemr clock face with all 12 numbers and set the hands to show 10 past 11.  Yes 5/5  If cognitive concerns identified, deferred for follow up unless specifically addressed in assessment and plan.    Fall Risk Assessment  Has the patient had two or more falls in the last year or any fall with injury in the last year?  No  If fall risk identified, deferred for follow up unless specifically addressed in assessment and plan.    Safety Assessment  Throw rugs on floor.  No  Handrails on all stairs.  Yes  Good lighting in all hallways.  Yes  Difficulty hearing.  No  Patient counseled about all safety risks that were  identified.    Functional Assessment ADLs  Are there any barriers preventing you from cooking for yourself or meeting nutritional needs?  Yes. Wife cook, shopping, medications, finances   Are there any barriers preventing you from driving safely or obtaining transportation?  Yes. Wife cook, shopping, medications, finances   Are there any barriers preventing you from using a telephone or calling for help?  No.    Are there any barriers preventing you from shopping?  Yes. Wife cook, shopping, medications, finances   Are there any barriers preventing you from taking care of your own finances?  Yes. Wife cook, shopping, medications, finances   Are there any barriers preventing you from managing your medications?  Yes. Wife cook, shopping, medications, finances   Are there any barriers preventing you from showering, bathing or dressing yourself?  No.    Are you currently engaging in any exercise or physical activity?  No.     What is your perception of your health?  Fair.    Health Maintenance Summary                Annual Wellness Visit Overdue 2/10/2018      Done 2/9/2017 Visit Dx: Medicare annual wellness visit, subsequent     Patient has more history with this topic...    IMM INFLUENZA Overdue 9/1/2018      Done 11/9/2016 Imm Admin: Influenza Vaccine Adult HD     Patient has more history with this topic...    IMM ZOSTER VACCINES Postponed 11/9/2018 Originally 1/21/1996. System: vaccine not available, other system reasons    IMM PNEUMOCOCCAL 65+ (ADULT) LOW/MEDIUM RISK SERIES Next Due 11/9/2018      Done 11/9/2017 Imm Admin: Pneumococcal Conjugate Vaccine (Prevnar/PCV-13)     Patient has more history with this topic...    IMM DTaP/Tdap/Td Vaccine Next Due 4/28/2024      Done 4/28/2014 Imm Admin: Tdap Vaccine    COLONOSCOPY Next Due 5/20/2024      Done 5/20/2014 AMB REFERRAL TO GI FOR COLONOSCOPY          Patient Care Team:  Lilo Luu M.D. as PCP - General (Family Medicine)  SUSAN Rajput as Consulting  "Physician (Family Medicine)  [facility] ([facility]) as Consulting Physician  Lester Lima M.D. as Consulting Physician (Gastroenterology)  [facility] as Consulting Physician    Social History   Substance Use Topics   • Smoking status: Former Smoker     Packs/day: 1.50     Years: 45.00     Types: Cigarettes     Quit date: 4/9/2002   • Smokeless tobacco: Never Used   • Alcohol use 0.0 oz/week      Comment: occasional beer     Family History   Problem Relation Age of Onset   • Hypertension Mother    • Hypertension Father      He  has a past medical history of CAD (coronary artery disease); CAD (coronary artery disease); Dementia; Diverticulitis; GI bleed; Hyperlipidemia; Hypertension; and MI, old.   History reviewed. No pertinent surgical history.    Exam:     Blood pressure 156/92, pulse 74, temperature 36.7 °C (98.1 °F), resp. rate 18, height 1.626 m (5' 4\"), weight 74.8 kg (165 lb), SpO2 90 %. Body mass index is 28.32 kg/m².    Hearing fair.    Dentition fair  Alert, oriented in no acute distress.  Eye contact is good, speech goal directed, affect calm.   On right lower eyelid there is a small erythematous papule.    Assessment and Plan. The following treatment and monitoring plan is recommended:    1. Medicare annual wellness visit, subsequent  HRA reviewed and abnormal.  Patient's previous medical history, healthcare maintenance and immunization status reviewed.  See discussion of anticipatory guidance and individual problems below.  Patient will return annually for Medicare annual well visit.    2. Old myocardial infarction, presumed vasospastic with no coronary arterial obstruction, 2002  Chronic and stable.  On medical management.  [facility]  Continue to monitor.   3. Benign hypertensive heart disease without heart failure  See above.   4. Essential hypertension  Elevated on recheck.  Patient's wife reports his blood pressures " [facility].  They plan to follow-up with his specialists there for medication adjustment.  Continue to monitor.   5. Pure hypercholesterolemia  See above.   6. Polycythemia rubra vera (HCC)  Chronic and stable.  [facility]  Continue to monitor.   7. Pulmonary nodule seen on imaging study  Chronic and stable.  [facility].  Serial CTs ordered to follow.   8. Diverticulosis of large intestine without hemorrhage  Chronic and stable.  Patient is asymptomatic.  Following with GI specialist as needed.   9. Acute seasonal allergic rhinitis due to pollen  Chronic and stable.  Continue use of OTC allergy medications and nasal sprays.  Continue to monitor.   10. Dementia without behavioral disturbance, unspecified dementia type  Chronic and stable.  Patient lives with his wife who is his primary caregiver.  Continue to monitor.   11. Seborrheic dermatitis  Chronic and stable.  Continue topical medications as needed and monitor.   12. Chronic respiratory failure with hypoxia (HCC)  This is a new diagnosis to me today.  Patient was recently started on Symbicort, CPAP and supplemental oxygen.  Following with pulmonology.  Continue to monitor.   13. Obstructive sleep apnea  See above.   14. COPD, severity to be determined (HCC)  See above.   15. Hordeolum externum of right lower eyelid  This is a new diagnosis.  Advised warm compresses.  Instructed patient to call if worsening or not improving.     Services suggested: No services needed at this time  Health Care Screening recommendations as per orders if indicated.  Referrals offered: PT/OT/Nutrition counseling/Behavioral Health/Smoking cessation as per orders if indicated.    Discussion today about general wellness and lifestyle habits:    · Prevent falls and reduce trip hazards; Cautioned about securing or removing rugs.  · Have a working fire alarm and carbon monoxide detector;   · Engage in regular physical  activity and social activities     Follow-up: Return in about 6 months (around 5/2/2019) for routine follow-up, short.

## 2019-04-10 ENCOUNTER — OFFICE VISIT (OUTPATIENT)
Dept: MEDICAL GROUP | Facility: PHYSICIAN GROUP | Age: 74
End: 2019-04-10
Payer: MEDICARE

## 2019-04-10 VITALS
HEART RATE: 99 BPM | HEIGHT: 64 IN | TEMPERATURE: 98.3 F | DIASTOLIC BLOOD PRESSURE: 72 MMHG | WEIGHT: 165 LBS | OXYGEN SATURATION: 92 % | SYSTOLIC BLOOD PRESSURE: 164 MMHG | RESPIRATION RATE: 14 BRPM | BODY MASS INDEX: 28.17 KG/M2

## 2019-04-10 DIAGNOSIS — J96.11 CHRONIC RESPIRATORY FAILURE WITH HYPOXIA (HCC): ICD-10-CM

## 2019-04-10 DIAGNOSIS — I10 ESSENTIAL HYPERTENSION: ICD-10-CM

## 2019-04-10 DIAGNOSIS — G47.33 OBSTRUCTIVE SLEEP APNEA: ICD-10-CM

## 2019-04-10 PROCEDURE — 99214 OFFICE O/P EST MOD 30 MIN: CPT | Performed by: FAMILY MEDICINE

## 2019-04-10 RX ORDER — LISINOPRIL 30 MG/1
30 TABLET ORAL DAILY
Qty: 30 TAB | Refills: 0 | Status: SHIPPED | OUTPATIENT
Start: 2019-04-10 | End: 2019-05-10 | Stop reason: SDUPTHER

## 2019-04-10 RX ORDER — VITAMIN E 200 UNIT
CAPSULE ORAL
COMMUNITY
End: 2022-05-10

## 2019-04-10 ASSESSMENT — PATIENT HEALTH QUESTIONNAIRE - PHQ9: CLINICAL INTERPRETATION OF PHQ2 SCORE: 0

## 2019-04-10 NOTE — PROGRESS NOTES
Subjective:   Alonso Chang Jr. is a 74 y.o. male here today for DMV paperwork. He is accompanied by his wife Johanny, today.     Arthritis  The patient's wife states that they are currently in the process of applying for a handicap permit. They are here to complete the necessary paperwork. No recent falls or injuries.    Essential hypertension  The patient is hypertensive in office today at 164/72. He does have a history of being hypertensive during his visits here as his last blood pressure was 156/92. Patient is currently taking amlodipine 10 mg and lisinopril 20 mg. [facility] but has not had any recent medication changes by them. Denies lightheadedness, vision changes, headache, palpitations or leg swelling.    Obstructive sleep apnea  His wife is concerned that his snoring has been worsening at night. He does have a history of sleep apnea with use of oxygen at night, although he does not use a C-pap. Patient does not experience any allergies, but he does report a recent cough with associated sputum production. [facility].    Current medicines (including changes today)  Current Outpatient Prescriptions   Medication Sig Dispense Refill   • docusate sodium (COLACE) 50 MG Cap Take 50 mg by mouth 2 times a day.     • MEGARED OMEGA-3 KRILL  MG Cap Take  by mouth.     • lisinopril (PRINIVIL, ZESTRIL) 30 MG tablet Take 1 Tab by mouth every day. 30 Tab 0   • budesonide-formoterol (SYMBICORT) 160-4.5 MCG/ACT Aerosol Inhale 2 Puffs by mouth 2 Times a Day.     • Lifitegrast (XIIDRA) 5 % Solution by Ophthalmic route.     • Calcium-Vitamin D (CALTRATE 600 PLUS-VIT D PO) Take  by mouth.     • Multiple Vitamins-Minerals (CENTRUM SILVER ADULT 50+ PO) Take  by mouth.     • Cholecalciferol (VITAMIN D PO) Take  by mouth.     • amlodipine (NORVASC) 10 MG TABS Take 0.5 Tabs by mouth every day. 30 Tab 11   • lovastatin (MEVACOR) 20 MG TABS Take 1 Tab by mouth  "every day. 30 Tab 3   • aspirin (ASA) 325 MG TABS Take 325 mg by mouth every 6 hours as needed.     • albuterol 108 (90 Base) MCG/ACT Aero Soln inhalation aerosol Inhale 2 Puffs by mouth every 6 hours as needed for Shortness of Breath.     • colchicine (COLCRYS) 0.6 MG Tab Take 1 Tab by mouth every day. 9 Tab 1   • ketoconazole (NIZORAL) 2 % Cream Use BID 60 g 1     No current facility-administered medications for this visit.      He  has a past medical history of CAD (coronary artery disease); CAD (coronary artery disease); Dementia; Diverticulitis; GI bleed; Hyperlipidemia; Hypertension; and MI, old.    ROS   No chest pain, no shortness of breath, no abdominal pain.     Objective:     Physical Exam:  BP (!) 164/72 (BP Location: Left arm, Patient Position: Sitting, BP Cuff Size: Adult)   Pulse 99   Temp 36.8 °C (98.3 °F) (Temporal)   Resp 14   Ht 1.626 m (5' 4\")   Wt 74.8 kg (165 lb)   SpO2 92%  Body mass index is 28.32 kg/m².   Constitutional: Alert, no distress, non-toxic.  Skin: Warm, dry, good turgor, no rashes in visible areas.  Eye: Equal, round and reactive, conjunctiva clear, lids normal.  ENMT: Lips without lesions, good dentition, oropharynx clear.  Neck: Trachea midline, no masses, no thyromegaly.   Respiratory: Unlabored respiratory effort, lungs clear to auscultation, no wheezes, no rhonchi.  Cardiovascular: Normal S1, S2, no murmur, no edema.  Psych: Alert and oriented x3, normal affect and mood.    Assessment and Plan:     1. Essential hypertension  This is a chronic problem for the patient that is worsening. He is currently taking amlodipine 10 mg and lisinopril 20 mg daily. Due to persistent elevation in the patient's blood pressure, medication changes will be made. [facility] is [facility]. Continue to monitor.    - lisinopril (PRINIVIL, ZESTRIL) 30 MG tablet; Take 1 Tab by mouth every day.  Dispense: 30 Tab; Refill: 0    2. Obstructive sleep apnea  3. Chronic respiratory " failure with hypoxia (HCC)  This is a chronic diagnosis for the patient. His snoring has been worsening. He is not currently using CPAP, but does use supplemental oxygen. [facility]. Continue to monitor.     Followup: 1 month          Rafiq FISHMAN (Scribe), am scribing for, and in the presence of, Lilo Luu MD    Electronically signed by: Rafiq Mo (Scribe), 4/10/2019    ILilo MD personally performed the services described in this documentation, as scribed by Rafiq Mo in my presence, and it is both accurate and complete.

## 2019-04-12 DIAGNOSIS — I15.9 SECONDARY HYPERTENSION: ICD-10-CM

## 2019-04-12 RX ORDER — AMLODIPINE BESYLATE 10 MG/1
5 TABLET ORAL DAILY
Qty: 30 TAB | Refills: 11 | OUTPATIENT
Start: 2019-04-12

## 2019-04-12 NOTE — TELEPHONE ENCOUNTER
Phone Number Called: [phone] (home)     Message: I spoke with patient's wife and she states they do not need this refilled and patient takes amlodipine 5 mg QD [facility].     Left Message for patient to call back: no

## 2019-04-29 NOTE — ASSESSMENT & PLAN NOTE
This is a chronic and stable issue for the patient. [facility] is [facility]. Has therapeutic phlebotomy every 8-12 weeks. His oxygen saturation is borderline low today at 90%. Patient's wife tells me that he has been evaluated for oxygen need, but as they live in a studio apartment, it would be unsafe.   <<----- Click to add NO pertinent Past Medical History No pertinent past medical history

## 2019-05-10 ENCOUNTER — OFFICE VISIT (OUTPATIENT)
Dept: MEDICAL GROUP | Facility: PHYSICIAN GROUP | Age: 74
End: 2019-05-10
Payer: MEDICARE

## 2019-05-10 VITALS
RESPIRATION RATE: 14 BRPM | HEIGHT: 64 IN | WEIGHT: 169 LBS | SYSTOLIC BLOOD PRESSURE: 142 MMHG | TEMPERATURE: 97.8 F | HEART RATE: 55 BPM | DIASTOLIC BLOOD PRESSURE: 78 MMHG | OXYGEN SATURATION: 94 % | BODY MASS INDEX: 28.85 KG/M2

## 2019-05-10 DIAGNOSIS — I10 ESSENTIAL HYPERTENSION: ICD-10-CM

## 2019-05-10 PROCEDURE — 99213 OFFICE O/P EST LOW 20 MIN: CPT | Performed by: FAMILY MEDICINE

## 2019-05-10 RX ORDER — LISINOPRIL 30 MG/1
30 TABLET ORAL DAILY
Qty: 30 TAB | Refills: 0 | Status: SHIPPED | OUTPATIENT
Start: 2019-05-10 | End: 2019-05-29 | Stop reason: SDUPTHER

## 2019-05-10 NOTE — PROGRESS NOTES
Subjective:   Alonso Chang Jr. is a 74 y.o. male here today for hypertension follow up. He is accompanied by his wife Johanny today.     Essential hypertension  Johanny tells me that Alonso's blood pressure has remained well-controlled since his last visit. She is currently checking his blood pressure daily. His blood pressure is currently 142/78 on lisinopril 30 mg. His wife adds that [facility] by [facility] who checked his blood pressure which read 133/82. The pharmacist did not recommend any medication change at that time. He feels well and denies any lightheadedness, vision changes, headache, palpitations or leg swelling     Current medicines (including changes today)  Current Outpatient Prescriptions   Medication Sig Dispense Refill   • lisinopril (PRINIVIL, ZESTRIL) 30 MG tablet Take 1 Tab by mouth every day. 30 Tab 0   • docusate sodium (COLACE) 50 MG Cap Take 50 mg by mouth 2 times a day.     • MEGARED OMEGA-3 KRILL  MG Cap Take  by mouth.     • budesonide-formoterol (SYMBICORT) 160-4.5 MCG/ACT Aerosol Inhale 2 Puffs by mouth 2 Times a Day.     • albuterol 108 (90 Base) MCG/ACT Aero Soln inhalation aerosol Inhale 2 Puffs by mouth every 6 hours as needed for Shortness of Breath.     • Lifitegrast (XIIDRA) 5 % Solution by Ophthalmic route.     • Calcium-Vitamin D (CALTRATE 600 PLUS-VIT D PO) Take  by mouth.     • colchicine (COLCRYS) 0.6 MG Tab Take 1 Tab by mouth every day. 9 Tab 1   • Multiple Vitamins-Minerals (CENTRUM SILVER ADULT 50+ PO) Take  by mouth.     • ketoconazole (NIZORAL) 2 % Cream Use BID 60 g 1   • Cholecalciferol (VITAMIN D PO) Take  by mouth.     • amlodipine (NORVASC) 10 MG TABS Take 0.5 Tabs by mouth every day. 30 Tab 11   • lovastatin (MEVACOR) 20 MG TABS Take 1 Tab by mouth every day. 30 Tab 3   • aspirin (ASA) 325 MG TABS Take 325 mg by mouth every 6 hours as needed.       No current facility-administered medications for this visit.      He  has a  "past medical history of CAD (coronary artery disease); CAD (coronary artery disease); Dementia; Diverticulitis; GI bleed; Hyperlipidemia; Hypertension; and MI, old.    ROS   No chest pain, no shortness of breath, no abdominal pain, no lightheadedness, no vision changes, no headache, no palpitations or leg swelling     Objective:     Physical Exam:  /78 (BP Location: Left arm, Patient Position: Sitting, BP Cuff Size: Adult)   Pulse (!) 55   Temp 36.6 °C (97.8 °F) (Temporal)   Resp 14   Ht 1.626 m (5' 4\")   Wt 76.7 kg (169 lb)   SpO2 94%  Body mass index is 29.01 kg/m².   Constitutional: Alert, no distress, well-groomed.  Skin: Warm, dry, good turgor, no rashes in visible areas.  Eye: Equal, round and reactive, conjunctiva clear, lids normal.  ENMT: Lips without lesions, good dentition, moist mucous membranes.  Neck: Trachea midline, no masses, no thyromegaly.  Respiratory: Unlabored respiratory effort, no cough. Wearing supplemental oxygen.   MSK: Normal gait, moves all extremities.  Neuro: Grossly non-focal. No cranial nerve deficit. Strength and sensation intact.   Psych: Alert and oriented x3, flat affect and mood.    Assessment and Plan:     1. Essential hypertension  This is a chronic problem for the patient currently well-controlled with lisinopril 30 mg. No medication changes are necessary at this time, as I would like to avoid the patient becoming hypotensive due to his history of dementia. I have encouraged the patient to keep a log of his blood pressures [facility]. Refill provided today.   - lisinopril (PRINIVIL, ZESTRIL) 30 MG tablet; Take 1 Tab by mouth every day.  Dispense: 30 Tab; Refill: 0    Followup: Return in about 6 months (around 11/10/2019) for AWV.          Rafiq FISHMAN (Candace), am scribing for, and in the presence of, Lilo Luu MD    Electronically signed by: Rafiq Davis), 5/10/2019    Lilo FISHMAN MD personally performed the " services described in this documentation, as scribed by Rafiq Mo in my presence, and it is both accurate and complete.

## 2019-05-29 DIAGNOSIS — I10 ESSENTIAL HYPERTENSION: ICD-10-CM

## 2019-05-30 RX ORDER — LISINOPRIL 30 MG/1
TABLET ORAL
Qty: 90 TAB | Refills: 3 | Status: SHIPPED
Start: 2019-05-30 | End: 2020-01-28

## 2019-06-24 DIAGNOSIS — L21.9 SEBORRHEIC DERMATITIS: ICD-10-CM

## 2019-06-24 RX ORDER — KETOCONAZOLE 20 MG/G
CREAM TOPICAL
Qty: 60 G | Refills: 1 | Status: SHIPPED | OUTPATIENT
Start: 2019-06-24 | End: 2020-11-16

## 2019-07-06 ENCOUNTER — APPOINTMENT (OUTPATIENT)
Dept: RADIOLOGY | Facility: IMAGING CENTER | Age: 74
End: 2019-07-06
Attending: NURSE PRACTITIONER
Payer: MEDICARE

## 2019-07-06 ENCOUNTER — OFFICE VISIT (OUTPATIENT)
Dept: URGENT CARE | Facility: CLINIC | Age: 74
End: 2019-07-06
Payer: MEDICARE

## 2019-07-06 VITALS
HEART RATE: 98 BPM | SYSTOLIC BLOOD PRESSURE: 124 MMHG | OXYGEN SATURATION: 93 % | RESPIRATION RATE: 16 BRPM | TEMPERATURE: 99 F | DIASTOLIC BLOOD PRESSURE: 80 MMHG | HEIGHT: 64 IN | WEIGHT: 167 LBS | BODY MASS INDEX: 28.51 KG/M2

## 2019-07-06 DIAGNOSIS — L08.9 TOE INFECTION: ICD-10-CM

## 2019-07-06 DIAGNOSIS — M79.675 PAIN OF TOE OF LEFT FOOT: ICD-10-CM

## 2019-07-06 PROCEDURE — 73660 X-RAY EXAM OF TOE(S): CPT | Mod: TC,LT | Performed by: NURSE PRACTITIONER

## 2019-07-06 PROCEDURE — 99214 OFFICE O/P EST MOD 30 MIN: CPT | Performed by: NURSE PRACTITIONER

## 2019-07-06 RX ORDER — CEPHALEXIN 500 MG/1
500 CAPSULE ORAL 3 TIMES DAILY
Qty: 21 CAP | Refills: 0 | Status: SHIPPED | OUTPATIENT
Start: 2019-07-06 | End: 2019-07-13

## 2019-07-06 ASSESSMENT — ENCOUNTER SYMPTOMS
CHILLS: 0
DIZZINESS: 0
EYE PAIN: 0
SHORTNESS OF BREATH: 0
SORE THROAT: 0
VOMITING: 0
NUMBNESS: 0
MYALGIAS: 0
JOINT SWELLING: 1
WEAKNESS: 0
FEVER: 0
NAUSEA: 0

## 2019-07-06 NOTE — PROGRESS NOTES
"Subjective:   Alonso Chang Jr. is a 74 y.o. male who presents for Foot Problem (left foot problem , middle toe , swelling color change , clear discharge x today )        Foot Problem   This is a new problem. The current episode started in the past 7 days (Patient cannot recall if he injured his foot or not.  Patient's wife answering all questions). The problem occurs intermittently. The problem has been waxing and waning. Associated symptoms include joint swelling. Pertinent negatives include no chest pain, chills, fever, myalgias, nausea, numbness, rash, sore throat, vomiting or weakness. Associated symptoms comments: Left 3rd toe painful, draining  . The symptoms are aggravated by walking. He has tried nothing for the symptoms. The treatment provided no relief.     Review of Systems   Constitutional: Negative for chills and fever.   HENT: Negative for sore throat.    Eyes: Negative for pain.   Respiratory: Negative for shortness of breath.    Cardiovascular: Negative for chest pain.   Gastrointestinal: Negative for nausea and vomiting.   Genitourinary: Negative for hematuria.   Musculoskeletal: Positive for joint pain and joint swelling. Negative for myalgias.   Skin: Negative for rash.   Neurological: Negative for dizziness, weakness and numbness.     Allergies   Allergen Reactions   • Vytorin Unspecified     [facility], [facility] is allergic to Vytorin      Objective:   /80 (BP Location: Left arm, Patient Position: Sitting, BP Cuff Size: Adult)   Pulse 98   Temp 37.2 °C (99 °F) (Temporal)   Resp 16   Ht 1.626 m (5' 4\")   Wt 75.8 kg (167 lb)   SpO2 93%   BMI 28.67 kg/m²   Physical Exam   Constitutional: He is oriented to person, place, and time. He appears well-developed and well-nourished. No distress.   HENT:   Head: Normocephalic and atraumatic.   Eyes: Pupils are equal, round, and reactive to light. Conjunctivae and EOM are normal.   Cardiovascular: Normal rate and regular rhythm.    No " murmur heard.  Pulmonary/Chest: Effort normal and breath sounds normal. No respiratory distress.   Abdominal: Soft. He exhibits no distension. There is no tenderness.   Musculoskeletal:        Left foot: There is tenderness, bony tenderness and swelling. There is normal range of motion.        Feet:    Neurological: He is alert and oriented to person, place, and time. He has normal reflexes. No sensory deficit.   Skin: Skin is warm and dry.   Psychiatric: He has a normal mood and affect.         Assessment/Plan:     1. Pain of toe of left foot  DX-TOE(S) 2+ LEFT    cephALEXin (KEFLEX) 500 MG Cap    mupirocin (BACTROBAN) 2 % Ointment   2. Toe infection  cephALEXin (KEFLEX) 500 MG Cap    mupirocin (BACTROBAN) 2 % Ointment       Xray results  1.  No acute left foot fracture or dislocation.  2.  Minimal subcutaneous air left 3rd nail bed region.  3.  No plain film evidence of osteomyelitis.    Patient is a 74-year-old male presented with the stated above.  Upon physical exam toenail partially avulsed which I would assume he injured the toe at some point.  Nail partially intact, surrounding tissue erythematous concerned of infection will start patient on Keflex 3 times daily x7 days.  Bandaged toe with Polysporin and bandage recommended bandage changes for 3 to 4 days.  Recommended Tylenol or ibuprofen for pain.  Patient given precautionary s/sx that mandate immediate follow up and evaluation in the ED. Advised of risks of not doing so.    DDX, Supportive care, and indications for immediate follow-up discussed with patient.    Instructed to return to clinic or nearest emergency department if we are not available for any change in condition, further concerns, or worsening of symptoms.    The patient demonstrated a good understanding and agreed with the treatment plan.

## 2019-07-10 ENCOUNTER — APPOINTMENT (OUTPATIENT)
Dept: MEDICAL GROUP | Facility: PHYSICIAN GROUP | Age: 74
End: 2019-07-10
Payer: MEDICARE

## 2019-11-13 ENCOUNTER — OFFICE VISIT (OUTPATIENT)
Dept: MEDICAL GROUP | Facility: PHYSICIAN GROUP | Age: 74
End: 2019-11-13
Payer: MEDICARE

## 2019-11-13 VITALS
DIASTOLIC BLOOD PRESSURE: 78 MMHG | RESPIRATION RATE: 16 BRPM | SYSTOLIC BLOOD PRESSURE: 130 MMHG | OXYGEN SATURATION: 93 % | HEART RATE: 95 BPM | TEMPERATURE: 99.4 F | BODY MASS INDEX: 29.22 KG/M2 | HEIGHT: 63 IN | WEIGHT: 164.9 LBS

## 2019-11-13 DIAGNOSIS — E78.00 PURE HYPERCHOLESTEROLEMIA: ICD-10-CM

## 2019-11-13 DIAGNOSIS — F03.90 DEMENTIA WITHOUT BEHAVIORAL DISTURBANCE, UNSPECIFIED DEMENTIA TYPE: Chronic | ICD-10-CM

## 2019-11-13 DIAGNOSIS — I10 ESSENTIAL HYPERTENSION: ICD-10-CM

## 2019-11-13 DIAGNOSIS — J96.11 CHRONIC RESPIRATORY FAILURE WITH HYPOXIA (HCC): ICD-10-CM

## 2019-11-13 DIAGNOSIS — R73.03 PREDIABETES: ICD-10-CM

## 2019-11-13 DIAGNOSIS — J44.9 COPD, SEVERITY TO BE DETERMINED (HCC): ICD-10-CM

## 2019-11-13 DIAGNOSIS — G47.33 OBSTRUCTIVE SLEEP APNEA: ICD-10-CM

## 2019-11-13 DIAGNOSIS — I25.2 OLD MYOCARDIAL INFARCTION: ICD-10-CM

## 2019-11-13 DIAGNOSIS — I11.9 BENIGN HYPERTENSIVE HEART DISEASE WITHOUT HEART FAILURE: ICD-10-CM

## 2019-11-13 DIAGNOSIS — D45 POLYCYTHEMIA RUBRA VERA (HCC): ICD-10-CM

## 2019-11-13 DIAGNOSIS — Z00.00 MEDICARE ANNUAL WELLNESS VISIT, SUBSEQUENT: ICD-10-CM

## 2019-11-13 PROBLEM — H00.012 HORDEOLUM EXTERNUM OF RIGHT LOWER EYELID: Status: RESOLVED | Noted: 2018-11-02 | Resolved: 2019-11-13

## 2019-11-13 PROCEDURE — G0439 PPPS, SUBSEQ VISIT: HCPCS | Performed by: FAMILY MEDICINE

## 2019-11-13 RX ORDER — AMLODIPINE BESYLATE 5 MG/1
5 TABLET ORAL DAILY
Qty: 1 TAB | Refills: 0
Start: 2019-11-13 | End: 2020-02-03 | Stop reason: SDUPTHER

## 2019-11-13 RX ORDER — AMLODIPINE BESYLATE 10 MG/1
10 TABLET ORAL DAILY
Qty: 1 TAB | Refills: 0
Start: 2019-11-13 | End: 2019-11-13

## 2019-11-13 ASSESSMENT — ACTIVITIES OF DAILY LIVING (ADL)
BATHING_REQUIRES_ASSISTANCE: 0
TRANSPORTATION COMMENTS: CAREGIVER

## 2019-11-13 ASSESSMENT — ENCOUNTER SYMPTOMS: GENERAL WELL-BEING: GOOD

## 2019-11-13 ASSESSMENT — PATIENT HEALTH QUESTIONNAIRE - PHQ9: CLINICAL INTERPRETATION OF PHQ2 SCORE: 0

## 2019-11-13 NOTE — PROGRESS NOTES
Chief Complaint   Patient presents with   • Annual Wellness Visit       HPI:  Alonso is a 74 y.o. here for Medicare Annual Wellness Visit. He is accompanied today by his wife. Patient is on 3L during the day and 2L at night.     Dementia without behavioral disturbance, unspecified dementia type (HCC)  Chronic and stable. Patient lives with his wife who is his primary care giver. He is not on medication.    Old myocardial infarction, presumed vasospastic with no coronary arterial obstruction, 2002  Benign hypertensive heart disease without heart failure  Chronic and stable. Patients wife notes that he has not been following with his cardiologist.     Essential Hypertension  He takes Amlodipine 5 mg and lisinopril 30 mg for his hypertension without any side effects. Blood pressure in the office today is elevated at 160/88. Upon recheck, it was 130/78. [facility] but [facility] would like for me to start managing it. He has been monitoring his blood pressure at home with readings in the high 140s.     Pure hypercholesterolemia  He has been taking Lovastatin 20 mg as prescribed for his dyslipidemia without myalgias or other side effects. Blood work was done before this visit.    Polycythemia rubra vera (HCC)  Chronic and stable. Patient [facility].    COPD, severity to be determined (HCC)  Chronic and stable. The patient is on 3 L of oxygen during the day and 2 L at night. His wife notes that he had not needed to use his albuterol inhaler recently. Denies cough, shortness of breath or any other symptoms.     Obstructive sleep apnea  Chronic and stable. Patient does not use a CPAP machine at night but is on 2 L of oxygen every night.    Chronic respiratory failure with hypoxia (HCC)  Chronic and stable. Patient is on 3 L of oxygen during the day and 2 L at night.    Prediabetes  [facility] showed elevated blood glucose levels.  He will continue to manage this through his own efforts.     Patient Active Problem List    Diagnosis Date Noted   • Old myocardial infarction, presumed vasospastic with no coronary arterial obstruction, 2002 05/20/2014     Priority: Low   • Chronic respiratory failure with hypoxia ([facility]) 11/02/2018   • Obstructive sleep apnea 11/02/2018   • COPD, severity to be determined ([facility]) 11/02/2018   • Acute seasonal allergic rhinitis due to pollen 04/04/2018   • Pulmonary nodule seen on imaging study 01/07/2015   • Polycythemia rubra vera ([facility]) 01/07/2015   • Benign hypertensive heart disease without heart failure 05/20/2014   • Seborrheic dermatitis 04/16/2014   • Dementia ([facility]) 04/16/2014   • Essential hypertension 04/16/2014   • Pure hypercholesterolemia 04/16/2014   • Diverticulosis 04/16/2014     Current Outpatient Medications   Medication Sig Dispense Refill   • amLODIPine (NORVASC) 10 MG Tab Take 1 Tab by mouth every day. 1 Tab 0   • mupirocin (BACTROBAN) 2 % Ointment Apply 1 Application to affected area(s) 2 times a day. 1 Tube 0   • ketoconazole (NIZORAL) 2 % Cream Apply thin layer to rash twice a day as needed for up to two weeks. 60 g 1   • lisinopril (PRINIVIL, ZESTRIL) 30 MG tablet TAKE 1 TABLET BY MOUTH ONCE DAILY 90 Tab 3   • docusate sodium (COLACE) 50 MG Cap Take 50 mg by mouth 2 times a day.     • MEGARED OMEGA-3 KRILL  MG Cap Take  by mouth.     • budesonide-formoterol (SYMBICORT) 160-4.5 MCG/ACT Aerosol Inhale 2 Puffs by mouth 2 Times a Day.     • albuterol 108 (90 Base) MCG/ACT Aero Soln inhalation aerosol Inhale 2 Puffs by mouth every 6 hours as needed for Shortness of Breath.     • Lifitegrast (XIIDRA) 5 % Solution by Ophthalmic route.     • Calcium-Vitamin D (CALTRATE 600 PLUS-VIT D PO) Take  by mouth.     • colchicine (COLCRYS) 0.6 MG Tab Take 1 Tab by mouth every day. 9 Tab 1   • Multiple Vitamins-Minerals (CENTRUM SILVER ADULT 50+ PO) Take  by mouth.     • Cholecalciferol (VITAMIN D PO) Take   by mouth.     • lovastatin (MEVACOR) 20 MG TABS Take 1 Tab by mouth every day. 30 Tab 3   • aspirin (ASA) 325 MG TABS Take 325 mg by mouth every 6 hours as needed.       No current facility-administered medications for this visit.       Patient is taking medications as noted in medication list.  Current supplements as per medication list.     Allergies: Vytorin    Current social contact/activities: Watch TV     Is patient current with immunizations? No, due for PNEUMOVAX (PPSV23). Patient is interested in receiving PNEUMOVAX (PPSV23) today.Pt had PPSV 23 09/30/19    He  reports that he quit smoking about 17 years ago. His smoking use included cigarettes. He has a 67.50 pack-year smoking history. He has never used smokeless tobacco. He reports current alcohol use. He reports that he does not use drugs.  Counseling given: Not Answered    DPA/Advanced directive: Patient does not have an Advanced Directive.  A packet and workshop information was given on Advanced Directives.    ROS:    Gait: Uses no assistive device   Ostomy: No   Other tubes: No   Amputations: No   Chronic oxygen use Yes 2 -3 LPM  Last eye exam 2019   Wears hearing aids: No   : Denies any urinary leakage during the last 6 months    Screening:    Screening for Depression  Little interest or pleasure in doing things?  0 - not at all  Feeling down, depressed, or hopeless? 0 - not at all  Patient Health Questionnaire Score: 0    If depressive symptoms identified deferred to follow up visit unless specifically addressed in assessment and plan.    Interpretation of PHQ-9 Total Score   Score Severity   1-4 No Depression   5-9 Mild Depression   10-14 Moderate Depression   15-19 Moderately Severe Depression   20-27 Severe Depression    Screening for Cognitive Impairment  Three Minute Recall (village, kitchen, baby)  0/3 Village Kitchen Baby  Delmer clock face with all 12 numbers and set the hands to show 10 past 10.  Yes Time 10:10   5/5  If cognitive concerns  identified, deferred for follow up unless specifically addressed in assessment and plan.    Fall Risk Assessment  Has the patient had two or more falls in the last year or any fall with injury in the last year?  No  If fall risk identified, deferred for follow up unless specifically addressed in assessment and plan.    Safety Assessment  Throw rugs on floor.  Yes  Handrails on all stairs.  No  Good lighting in all hallways.  Yes  Difficulty hearing.  Yes  Patient counseled about all safety risks that were identified.    Functional Assessment ADLs  Are there any barriers preventing you from cooking for yourself or meeting nutritional needs?  No.    Are there any barriers preventing you from driving safely or obtaining transportation?  Yes. caregiver   Are there any barriers preventing you from using a telephone or calling for help?  Yes. unknown  Are there any barriers preventing you from shopping?  Yes. Sometime goes with wife  Are there any barriers preventing you from taking care of your own finances?  Yes. Wife pays  Are there any barriers preventing you from managing your medications?  Yes. Wife takes care of  Are there any barriers preventing you from showering, bathing or dressing yourself?  No.    Are you currently engaging in any exercise or physical activity?  No.     What is your perception of your health?  Good.    Health Maintenance Summary                HEPATITIS C SCREENING Overdue 1945     PFT SCREENING-FEV1 AND FEV/FVC RATIO / SPIROMETRY SHOULD BE PERFORMED ANNUALLY Overdue 1/21/1964     IMM ZOSTER VACCINES Overdue 1/21/1996     IMM PNEUMOCOCCAL VACCINE: 65+ Years Overdue 11/9/2018      Done 11/9/2017 Imm Admin: Pneumococcal Conjugate Vaccine (Prevnar/PCV-13)     Patient has more history with this topic...    IMM INFLUENZA Overdue 9/1/2019      Done 11/9/2016 Imm Admin: Influenza Vaccine Adult HD     Patient has more history with this topic...    Annual Wellness Visit Overdue 11/3/2019       "Done 2018 Visit Dx: Medicare annual wellness visit, subsequent     Patient has more history with this topic...    IMM DTaP/Tdap/Td Vaccine Next Due 2024      Done 2014 Imm Admin: Tdap Vaccine    COLONOSCOPY Next Due 2024      Done 2014 AMB REFERRAL TO GI FOR COLONOSCOPY        Patient Care Team:  Lilo Luu M.D. as PCP - General (Family Medicine)  [facility] as Consulting Physician  Lester Lima M.D. as Consulting Physician (Gastroenterology)  [facility] as Consulting Physician    Social History     Tobacco Use   • Smoking status: Former Smoker     Packs/day: 1.50     Years: 45.00     Pack years: 67.50     Types: Cigarettes     Last attempt to quit: 2002     Years since quittin.6   • Smokeless tobacco: Never Used   Substance Use Topics   • Alcohol use: Yes     Alcohol/week: 0.0 oz     Comment: occasional beer   • Drug use: No     Family History   Problem Relation Age of Onset   • Hypertension Mother    • Hypertension Father      He  has a past medical history of CAD (coronary artery disease), CAD (coronary artery disease), Dementia (HCC), Diverticulitis, GI bleed, Hyperlipidemia, Hypertension, and MI, old.   History reviewed. No pertinent surgical history.    Exam:     /88 (BP Location: Right arm, Patient Position: Sitting, BP Cuff Size: Adult)   Pulse 95   Temp 37.4 °C (99.4 °F) (Temporal)   Resp 16   Ht 1.6 m (5' 3\")   Wt 74.8 kg (164 lb 14.5 oz)   SpO2 93%  Body mass index is 29.21 kg/m².    Hearing good.    Dentition good  Alert, oriented in no acute distress.  Eye contact is good, speech goal directed, affect calm    Assessment and Plan. The following treatment and monitoring plan is recommended:     1. Medicare annual wellness visit, subsequent  Patient is a 74 year old male who presents today for an annual wellness visit. He reports feeling well and has no new medical complaints at this " time.    2. Dementia without behavioral disturbance, unspecified dementia type (HCC)  Chronic and stable. We will continue to monitor the patients condition.      3. Old myocardial infarction, presumed vasospastic with no coronary arterial obstruction, 2002  Chronic and stable. Advised the patient to continue to follow with his cardiologist.     4. Benign hypertensive heart disease without heart failure  See above.    5. Essential hypertension  The patients blood pressure was elevated upon initial evaluation but was within normal range upon recheck. At this time we will continue the current Amlodipine and Lisinopril dosages. I have advised him to avoid salty foods and exercise regularly.  - amLODIPine (NORVASC) 5 MG Tab; Take 1 Tab by mouth every day.  Dispense: 1 Tab; Refill: 0    6. Pure hypercholesterolemia  Stable on current Lovastatin dosage. I have advised the patient to increase his exercise regimen and avoid fatty foods. Labs have been ordered for the next follow up visit.     7. Polycythemia rubra vera (HCC)  Chronic and stable. [facility]    8. COPD, severity to be determined (HCC)  Chronic and stable. Patient continues to use oxygen 24 hours a day and is not experiencing any worsening symptoms. We will continue to monitor his condition.     9. Obstructive sleep apnea  See above    10. Chronic respiratory failure with hypoxia (HCC)  See above    11. Prediabetes  We will continue to watch the patients blood glucose levels. Advised him to decrease his intake of sugary foods and carbohydrates as well as exercise regularly.     Services suggested: No services needed at this time  Health Care Screening recommendations as per orders if indicated.  Referrals offered: PT/OT/Nutrition counseling/Behavioral Health/Smoking cessation as per orders if indicated.    Discussion today about general wellness and lifestyle habits:    · Prevent falls and reduce trip hazards; Cautioned  about securing or removing rugs.  · Have a working fire alarm and carbon monoxide detector;   · Engage in regular physical activity and social activities     Follow-up: Return in about 6 months (around 5/13/2020) for HTN, Short.

## 2020-01-02 NOTE — ASSESSMENT & PLAN NOTE
No evidence of toxicity. Okay to continue plaquenil therapy. No recent change. He is not currently on any medications.

## 2020-01-03 ENCOUNTER — TELEPHONE (OUTPATIENT)
Dept: MEDICAL GROUP | Facility: PHYSICIAN GROUP | Age: 75
End: 2020-01-03

## 2020-01-03 NOTE — TELEPHONE ENCOUNTER
Pt Dentist Hygienist at [facility] called to let us know that the pt was not able to get his dental cleaning done today due to his B/P being 210/110. Pt told them that  knows it is that high but without verification from the provider they were not able to do the cleaning. Please fax Alfonso at [phone] with any info so pt can reschedule his appt

## 2020-01-04 NOTE — TELEPHONE ENCOUNTER
Patient should be seen for a visit to have blood pressure checked and for us to make a plan for his blood pressure when he goes to the dentist.  Patient's wife is caregiver.  -Lilo Luu M.D.

## 2020-01-04 NOTE — TELEPHONE ENCOUNTER
Phone Number Called: [phone] (home)     Call outcome: left message for patient to call back regarding message below    Message: lvm for patient to make appointment

## 2020-01-06 NOTE — TELEPHONE ENCOUNTER
Phone Number Called: [phone] (home)     Call outcome: spoke to patient regarding message below    Message: made patient appt for 1/20. Patient declined sooner appt

## 2020-01-20 ENCOUNTER — OFFICE VISIT (OUTPATIENT)
Dept: MEDICAL GROUP | Facility: PHYSICIAN GROUP | Age: 75
End: 2020-01-20
Payer: MEDICARE

## 2020-01-20 VITALS
OXYGEN SATURATION: 97 % | RESPIRATION RATE: 16 BRPM | DIASTOLIC BLOOD PRESSURE: 86 MMHG | HEIGHT: 63 IN | HEART RATE: 69 BPM | TEMPERATURE: 97.8 F | WEIGHT: 164 LBS | BODY MASS INDEX: 29.06 KG/M2 | SYSTOLIC BLOOD PRESSURE: 138 MMHG

## 2020-01-20 DIAGNOSIS — R03.0 ELEVATED BLOOD PRESSURE READING: ICD-10-CM

## 2020-01-20 DIAGNOSIS — I10 ESSENTIAL HYPERTENSION: ICD-10-CM

## 2020-01-20 PROCEDURE — 99213 OFFICE O/P EST LOW 20 MIN: CPT | Performed by: FAMILY MEDICINE

## 2020-01-20 ASSESSMENT — PATIENT HEALTH QUESTIONNAIRE - PHQ9: CLINICAL INTERPRETATION OF PHQ2 SCORE: 0

## 2020-01-20 NOTE — PROGRESS NOTES
Subjective:   Alonso Chang Jr. is a 74 y.o. male here today for essential hypertension and recent elevated blood pressure reading. He is accompanied by his wife, Johanny, who provides additional information.    1. Essential hypertension  2. Elevated blood pressure reading   Patient has a history of chronic hypertension and is taking amlodipine 5 mg tab, once daily and lisinopril 30 mg tab, once daily. No medication side effects were reported. He reportedly monitors blood pressure regularly at home, but cannot remember what the readings have been recently. Blood pressure is 138/86 today. He says his blood pressure was high at his recent dentist visit at 210/110. He was unable to get his dental cleaning secondary to the high blood pressure. His wife says he was seen at Urgent Care following his dentist visit, at which time his blood pressure was elevated as well. He denies any related complaints including chronic cough, chest pain, headaches, leg swelling, light-headedness or dizziness.    Current medicines (including changes today)  Current Outpatient Medications   Medication Sig Dispense Refill   • amLODIPine (NORVASC) 5 MG Tab Take 1 Tab by mouth every day. 1 Tab 0   • mupirocin (BACTROBAN) 2 % Ointment Apply 1 Application to affected area(s) 2 times a day. 1 Tube 0   • ketoconazole (NIZORAL) 2 % Cream Apply thin layer to rash twice a day as needed for up to two weeks. 60 g 1   • lisinopril (PRINIVIL, ZESTRIL) 30 MG tablet TAKE 1 TABLET BY MOUTH ONCE DAILY 90 Tab 3   • docusate sodium (COLACE) 50 MG Cap Take 50 mg by mouth 2 times a day.     • MEGARED OMEGA-3 KRILL  MG Cap Take  by mouth.     • budesonide-formoterol (SYMBICORT) 160-4.5 MCG/ACT Aerosol Inhale 2 Puffs by mouth 2 Times a Day.     • albuterol 108 (90 Base) MCG/ACT Aero Soln inhalation aerosol Inhale 2 Puffs by mouth every 6 hours as needed for Shortness of Breath.     • Lifitegrast (XIIDRA) 5 % Solution by Ophthalmic route.     •  "Calcium-Vitamin D (CALTRATE 600 PLUS-VIT D PO) Take  by mouth.     • colchicine (COLCRYS) 0.6 MG Tab Take 1 Tab by mouth every day. 9 Tab 1   • Multiple Vitamins-Minerals (CENTRUM SILVER ADULT 50+ PO) Take  by mouth.     • Cholecalciferol (VITAMIN D PO) Take  by mouth.     • lovastatin (MEVACOR) 20 MG TABS Take 1 Tab by mouth every day. 30 Tab 3   • aspirin (ASA) 325 MG TABS Take 325 mg by mouth every 6 hours as needed.       No current facility-administered medications for this visit.      He  has a past medical history of CAD (coronary artery disease), CAD (coronary artery disease), Dementia (HCC), Diverticulitis, GI bleed, Hyperlipidemia, Hypertension, and MI, old.    ROS   No chest pain, no shortness of breath, no abdominal pain.     Objective:     Physical Exam:  /86   Pulse 69   Temp 36.6 °C (97.8 °F)   Resp 16   Ht 1.6 m (5' 3\")   Wt 74.4 kg (164 lb)   SpO2 97%  Body mass index is 29.05 kg/m².   Constitutional: Alert, chronically-ill male in no distress.  Skin: Warm, dry, good turgor, no rashes in visible areas.  Eye: Equal, round and reactive, conjunctiva clear, lids normal.  ENMT: Lips without lesions, good dentition, oropharynx clear.  Neck: Trachea midline, no masses, no thyromegaly.   Respiratory: He is on supplementary oxygen via portable concentrator. Unlabored respiratory effort, lungs clear to auscultation, no wheezes, no rhonchi.  Cardiovascular: Normal S1, S2, no murmur, no edema.  Abdomen: Soft, non-tender, no masses, no hepatosplenomegaly.  Psych: Flat affect. Alert and oriented x3.    Assessment and Plan:     1. Essential hypertension  2. Elevated blood pressure reading  Chronic. His wife reports recent episodes of high blood pressure readings, but cannot remember what his home blood pressure has been recently. The patient will monitor his home blood pressure readings for the next 5 days, and we will revaluate if we need to change blood pressure medication dosage at that time.  "     Followup: Return if symptoms worsen or fail to improve.          IMikel (Scribe), am scribing for, and in the presence of, Lilo Luu MD    Electronically signed by: Mikel Raygoza (Aaronibe), 1/20/2020    I, Lilo Luu MD personally performed the services described in this documentation, as scribed by Mikel Raygoza in my presence, and it is both accurate and complete.

## 2020-01-27 ENCOUNTER — TELEPHONE (OUTPATIENT)
Dept: MEDICAL GROUP | Facility: PHYSICIAN GROUP | Age: 75
End: 2020-01-27

## 2020-01-27 DIAGNOSIS — I10 ESSENTIAL HYPERTENSION: ICD-10-CM

## 2020-01-27 NOTE — TELEPHONE ENCOUNTER
Blood pressure is higher than I would like.  My goal for him is to be below 150/90 consistently.  I would like to increase his lisinopril to 40mg.  Do they need a new prescription?  -Lilo Luu M.D.

## 2020-01-27 NOTE — TELEPHONE ENCOUNTER
Pts spouse called in with BP readings    1/21 3:25 /88 pluse 81  1/22 5:30 /82 pulse 83  1/23 3:00 /82 pulse 80  1/24 8:00 /87 pulse 86  1/25 7:25 /84 pulse 74  1/26 8:10 /88 pulse 89

## 2020-01-28 RX ORDER — LISINOPRIL 40 MG/1
40 TABLET ORAL DAILY
Qty: 90 TAB | Refills: 3 | Status: SHIPPED | OUTPATIENT
Start: 2020-01-28 | End: 2021-01-11

## 2020-01-28 NOTE — TELEPHONE ENCOUNTER
Phone Number Called: [phone] (home)     Call outcome: spoke to patient regarding message below    Message: patient needs new rx for 40mg

## 2020-02-03 DIAGNOSIS — I10 ESSENTIAL HYPERTENSION: ICD-10-CM

## 2020-02-03 RX ORDER — AMLODIPINE BESYLATE 5 MG/1
5 TABLET ORAL DAILY
Qty: 90 TAB | Refills: 3 | Status: SHIPPED | OUTPATIENT
Start: 2020-02-03 | End: 2021-01-22

## 2020-06-17 ENCOUNTER — OFFICE VISIT (OUTPATIENT)
Dept: MEDICAL GROUP | Facility: PHYSICIAN GROUP | Age: 75
End: 2020-06-17
Payer: MEDICARE

## 2020-06-17 VITALS
TEMPERATURE: 98.5 F | HEIGHT: 63 IN | BODY MASS INDEX: 28.7 KG/M2 | HEART RATE: 92 BPM | RESPIRATION RATE: 18 BRPM | SYSTOLIC BLOOD PRESSURE: 124 MMHG | OXYGEN SATURATION: 92 % | WEIGHT: 162 LBS | DIASTOLIC BLOOD PRESSURE: 78 MMHG

## 2020-06-17 DIAGNOSIS — R21 RASH: ICD-10-CM

## 2020-06-17 DIAGNOSIS — I10 ESSENTIAL HYPERTENSION: ICD-10-CM

## 2020-06-17 PROCEDURE — 99213 OFFICE O/P EST LOW 20 MIN: CPT | Performed by: FAMILY MEDICINE

## 2020-06-17 RX ORDER — TRIAMCINOLONE ACETONIDE 1 MG/G
1 CREAM TOPICAL 2 TIMES DAILY
Qty: 1 TUBE | Refills: 0 | Status: SHIPPED | OUTPATIENT
Start: 2020-06-17 | End: 2020-06-27

## 2020-06-17 NOTE — PROGRESS NOTES
Subjective:   Alonso Chang Jr. is a 75 y.o. male here today for follow-up hypertension and rash.  He is accompanied by his wife, Zina for today's appointment.    Alonso's blood pressure is in good range today.  He continues to take lisinopril 40mg daily and amlodipine 5mg daily.  No A/E.  Denies lightheadedness, vision changes, headache, palpitations or leg swelling.    He has had a rash behind his ears for several months off and on.  His wife has been putting chamomile lotion on it which used to help, but not as much lately.  It is flaky and itchy.  No drainage.    Current medicines (including changes today)  Current Outpatient Medications   Medication Sig Dispense Refill   • triamcinolone acetonide (KENALOG) 0.1 % Cream Apply 1 Application to affected area(s) 2 times a day for 10 days. Behind ears. 1 Tube 0   • amLODIPine (NORVASC) 5 MG Tab Take 1 Tab by mouth every day. 90 Tab 3   • lisinopril (PRINIVIL) 40 MG tablet Take 1 Tab by mouth every day. 90 Tab 3   • mupirocin (BACTROBAN) 2 % Ointment Apply 1 Application to affected area(s) 2 times a day. 1 Tube 0   • ketoconazole (NIZORAL) 2 % Cream Apply thin layer to rash twice a day as needed for up to two weeks. 60 g 1   • docusate sodium (COLACE) 50 MG Cap Take 50 mg by mouth 2 times a day.     • MEGARED OMEGA-3 KRILL  MG Cap Take  by mouth.     • budesonide-formoterol (SYMBICORT) 160-4.5 MCG/ACT Aerosol Inhale 2 Puffs by mouth 2 Times a Day.     • albuterol 108 (90 Base) MCG/ACT Aero Soln inhalation aerosol Inhale 2 Puffs by mouth every 6 hours as needed for Shortness of Breath.     • Lifitegrast (XIIDRA) 5 % Solution by Ophthalmic route.     • Calcium-Vitamin D (CALTRATE 600 PLUS-VIT D PO) Take  by mouth.     • colchicine (COLCRYS) 0.6 MG Tab Take 1 Tab by mouth every day. 9 Tab 1   • Multiple Vitamins-Minerals (CENTRUM SILVER ADULT 50+ PO) Take  by mouth.     • Cholecalciferol (VITAMIN D PO) Take  by mouth.     • lovastatin (MEVACOR) 20 MG TABS  "Take 1 Tab by mouth every day. 30 Tab 3   • aspirin (ASA) 325 MG TABS Take 325 mg by mouth every 6 hours as needed.       No current facility-administered medications for this visit.      He  has a past medical history of CAD (coronary artery disease), CAD (coronary artery disease), Dementia (HCC), Diverticulitis, GI bleed, Hyperlipidemia, Hypertension, and MI, old.    ROS   No fever, no nausea, no chest pain, no shortness of breath, no abdominal pain.     Objective:     Physical Exam:  /78   Pulse 92   Temp 36.9 °C (98.5 °F)   Resp 18   Ht 1.6 m (5' 3\")   Wt 73.5 kg (162 lb)   SpO2 92%  Body mass index is 28.7 kg/m².   Constitutional: Alert, no distress, well-groomed.  Skin: Behind ears there area poorly-demarcated, flat areas of hyperpigmentation with some overlying scaling and flaking. No open wounds or drainage.  Eye: Equal, round and reactive, conjunctiva clear, lids normal.  ENMT: Lips without lesions, good dentition, moist mucous membranes. +NC.  Neck: Trachea midline, no masses, no thyromegaly.  Respiratory: Unlabored respiratory effort, no cough.  Abdomen: Soft, no gross masses.  MSK: Normal gait, moves all extremities.  Neuro: Grossly non-focal. No cranial nerve deficit. Strength and sensation intact.   Psych: Alert, chronic confusion secondary to dementia, normal affect and mood.    Assessment and Plan:     1. Essential hypertension  Well-controlled on current regimen.  Labs as indicated.  Continue antihypertensive medications.  Discussed decreasing salt intake.  Emphasized benefits of exercise and diet. Continue to monitor.    2. Rash  New.  Differentials include chronic pressure from nasal cannula tubing versus irritant/contact dermatitis versus yeast.  As it is pruritic, will treat first with topical steroid cream.  Patient's wife will call with an update after 7-10 days.  - triamcinolone acetonide (KENALOG) 0.1 % Cream; Apply 1 Application to affected area(s) 2 times a day for 10 days. " Behind ears.  Dispense: 1 Tube; Refill: 0    Followup: Return in about 5 months (around 11/17/2020) for AWV.         PLEASE NOTE: This dictation was created using voice recognition software. I have made every reasonable attempt to correct obvious errors, but I expect that there are errors of grammar and possibly content that I did not discover before finalizing the note.

## 2020-11-16 ENCOUNTER — APPOINTMENT (OUTPATIENT)
Dept: MEDICAL GROUP | Facility: PHYSICIAN GROUP | Age: 75
End: 2020-11-16
Payer: MEDICARE

## 2020-11-16 ENCOUNTER — OFFICE VISIT (OUTPATIENT)
Dept: MEDICAL GROUP | Facility: PHYSICIAN GROUP | Age: 75
End: 2020-11-16
Payer: MEDICARE

## 2020-11-16 VITALS
BODY MASS INDEX: 28.7 KG/M2 | WEIGHT: 162 LBS | DIASTOLIC BLOOD PRESSURE: 72 MMHG | SYSTOLIC BLOOD PRESSURE: 150 MMHG | OXYGEN SATURATION: 94 % | TEMPERATURE: 97.9 F | HEART RATE: 110 BPM | HEIGHT: 63 IN | RESPIRATION RATE: 16 BRPM

## 2020-11-16 DIAGNOSIS — R21 RASH: ICD-10-CM

## 2020-11-16 DIAGNOSIS — I10 ESSENTIAL HYPERTENSION: ICD-10-CM

## 2020-11-16 DIAGNOSIS — D45 POLYCYTHEMIA VERA (HCC): ICD-10-CM

## 2020-11-16 DIAGNOSIS — Z00.00 MEDICARE ANNUAL WELLNESS VISIT, SUBSEQUENT: ICD-10-CM

## 2020-11-16 DIAGNOSIS — G47.33 OBSTRUCTIVE SLEEP APNEA: ICD-10-CM

## 2020-11-16 DIAGNOSIS — E78.00 PURE HYPERCHOLESTEROLEMIA: ICD-10-CM

## 2020-11-16 DIAGNOSIS — Z23 NEED FOR VACCINATION: ICD-10-CM

## 2020-11-16 DIAGNOSIS — J44.9 COPD, SEVERITY TO BE DETERMINED (HCC): ICD-10-CM

## 2020-11-16 DIAGNOSIS — I11.9 BENIGN HYPERTENSIVE HEART DISEASE WITHOUT HEART FAILURE: ICD-10-CM

## 2020-11-16 DIAGNOSIS — F03.90 DEMENTIA WITHOUT BEHAVIORAL DISTURBANCE, UNSPECIFIED DEMENTIA TYPE: Chronic | ICD-10-CM

## 2020-11-16 DIAGNOSIS — J96.11 CHRONIC RESPIRATORY FAILURE WITH HYPOXIA (HCC): ICD-10-CM

## 2020-11-16 PROCEDURE — G0008 ADMIN INFLUENZA VIRUS VAC: HCPCS | Performed by: FAMILY MEDICINE

## 2020-11-16 PROCEDURE — 90662 IIV NO PRSV INCREASED AG IM: CPT | Performed by: FAMILY MEDICINE

## 2020-11-16 PROCEDURE — G0439 PPPS, SUBSEQ VISIT: HCPCS | Performed by: FAMILY MEDICINE

## 2020-11-16 RX ORDER — TRIAMCINOLONE ACETONIDE 1 MG/G
1 CREAM TOPICAL 2 TIMES DAILY
Qty: 45 G | Refills: 2 | Status: SHIPPED | OUTPATIENT
Start: 2020-11-16

## 2020-11-16 ASSESSMENT — ACTIVITIES OF DAILY LIVING (ADL): BATHING_REQUIRES_ASSISTANCE: 1

## 2020-11-16 ASSESSMENT — ENCOUNTER SYMPTOMS: GENERAL WELL-BEING: FAIR

## 2020-11-16 ASSESSMENT — PATIENT HEALTH QUESTIONNAIRE - PHQ9: CLINICAL INTERPRETATION OF PHQ2 SCORE: 0

## 2020-11-16 NOTE — PROGRESS NOTES
Chief Complaint   Patient presents with   • Annual Exam     AWV     HPI:  Alonso Chang Jr. is a 75 y.o. here for Medicare Annual Wellness Visit.  He is accompanied by his wife, Zina.    Patient Active Problem List    Diagnosis Date Noted   • Old myocardial infarction, presumed vasospastic with no coronary arterial obstruction, 2002 05/20/2014     Priority: Low   • Chronic respiratory failure with hypoxia ([facility]) 11/02/2018   • Obstructive sleep apnea 11/02/2018   • COPD, severity to be determined ([facility]) 11/02/2018   • Acute seasonal allergic rhinitis due to pollen 04/04/2018   • Pulmonary nodule seen on imaging study 01/07/2015   • Polycythemia rubra vera ([facility]) 01/07/2015   • Benign hypertensive heart disease without heart failure 05/20/2014   • Seborrheic dermatitis 04/16/2014   • Dementia ([facility]) 04/16/2014   • Essential hypertension 04/16/2014   • Pure hypercholesterolemia 04/16/2014   • Diverticulosis 04/16/2014     Current Outpatient Medications   Medication Sig Dispense Refill   • triamcinolone acetonide (KENALOG) 0.1 % Cream Apply 1 Application topically 2 times a day. 45 g 2   • amLODIPine (NORVASC) 5 MG Tab Take 1 Tab by mouth every day. 90 Tab 3   • lisinopril (PRINIVIL) 40 MG tablet Take 1 Tab by mouth every day. 90 Tab 3   • mupirocin (BACTROBAN) 2 % Ointment Apply 1 Application to affected area(s) 2 times a day. 1 Tube 0   • docusate sodium (COLACE) 50 MG Cap Take 50 mg by mouth 2 times a day.     • MEGARED OMEGA-3 KRILL  MG Cap Take  by mouth.     • budesonide-formoterol (SYMBICORT) 160-4.5 MCG/ACT Aerosol Inhale 2 Puffs by mouth 2 Times a Day.     • albuterol 108 (90 Base) MCG/ACT Aero Soln inhalation aerosol Inhale 2 Puffs by mouth every 6 hours as needed for Shortness of Breath.     • Lifitegrast (XIIDRA) 5 % Solution by Ophthalmic route.     • Calcium-Vitamin D (CALTRATE 600 PLUS-VIT D PO) Take  by mouth.     • colchicine (COLCRYS) 0.6 MG Tab Take 1 Tab by mouth every day. 9 Tab 1   •  Multiple Vitamins-Minerals (CENTRUM SILVER ADULT 50+ PO) Take  by mouth.     • Cholecalciferol (VITAMIN D PO) Take  by mouth.     • lovastatin (MEVACOR) 20 MG TABS Take 1 Tab by mouth every day. 30 Tab 3   • aspirin (ASA) 325 MG TABS Take 325 mg by mouth every 6 hours as needed.       No current facility-administered medications for this visit.           Current supplements as per medication list.     Allergies: Vytorin    Current social contact/activities: Watching TV, spending time with wife    He  reports that he quit smoking about 18 years ago. His smoking use included cigarettes. He has a 67.50 pack-year smoking history. He has never used smokeless tobacco. He reports current alcohol use. He reports that he does not use drugs.  Counseling given: Not Answered    DPA/Advanced Directive:No  ROS:    Gait: Uses no assistive device  Ostomy: No  Other tubes: No  Amputations: No  Chronic oxygen use: Yes  Last eye exam: 8/2019  Wears hearing aids: No   : Denies any urinary leakage during the last 6 months    Screening:    Depression Screening  Little interest or pleasure in doing things?  0 - not at all  Feeling down, depressed , or hopeless? 0 - not at all  Patient Health Questionnaire Score: 0     If depressive symptoms identified deferred to follow up visit unless specifically addressed in assessment and plan.    Interpretation of PHQ-9 Total Score   Score Severity   1-4 No Depression   5-9 Mild Depression   10-14 Moderate Depression   15-19 Moderately Severe Depression   20-27 Severe Depression    Screening for Cognitive Impairment  Three Minute Recall (river, nation, finger) 0/3    Delmer clock face with all 12 numbers and set the hands to show 10 past 11.  Yes    Cognitive concerns identified deferred for follow up unless specifically addressed in assessment and plan.    Fall Risk Assessment  Has the patient had two or more falls in the last year or any fall with injury in the last year?  No    Safety  Assessment  Throw rugs on floor.  Yes  Handrails on all stairs.  Yes  Good lighting in all hallways.  Yes  Difficulty hearing.  No  Patient counseled about all safety risks that were identified.    Functional Assessment ADLs  Are there any barriers preventing you from cooking for yourself or meeting nutritional needs?  No.    Are there any barriers preventing you from driving safely or obtaining transportation?  No.    Are there any barriers preventing you from using a telephone or calling for help?  Yes.    Are there any barriers preventing you from shopping?  No.    Are there any barriers preventing you from taking care of your own finances?  No.    Are there any barriers preventing you from managing your medications?  No.    Are there any barriers preventing you from showering, bathing or dressing yourself?  Yes.    Are you currently engaging in any exercise or physical activity?  No.     What is your perception of your health?  Fair.    Health Maintenance Summary                Annual Pulmonary Function Test / Spirometry Overdue 1/21/1951     IMM ZOSTER VACCINES Overdue 1/21/1996     Annual Wellness Visit Next Due 11/17/2021      Done 11/16/2020 Visit Dx: Medicare annual wellness visit, subsequent     Patient has more history with this topic...    IMM DTaP/Tdap/Td Vaccine Next Due 4/28/2024      Done 4/28/2014 Imm Admin: Tdap Vaccine    COLONOSCOPY Next Due 5/20/2024      Done 5/20/2014 AMB REFERRAL TO GI FOR COLONOSCOPY        Patient Care Team:  Lilo Luu M.D. as PCP - General (Family Medicine)  [facility] as Consulting Physician  Lester Lima M.D. as Consulting Physician (Gastroenterology)  [facility] (Inactive) as Consulting Physician   (Inactive) as Consulting Physician    Social History     Tobacco Use   • Smoking status: Former Smoker     Packs/day: 1.50     Years: 45.00     Pack years:  "67.50     Types: Cigarettes     Quit date: 2002     Years since quittin.6   • Smokeless tobacco: Never Used   Substance Use Topics   • Alcohol use: Yes     Alcohol/week: 0.0 oz     Comment: occasional beer   • Drug use: No     Family History   Problem Relation Age of Onset   • Hypertension Mother    • Hypertension Father      He  has a past medical history of CAD (coronary artery disease), CAD (coronary artery disease), Dementia (HCC), Diverticulitis, GI bleed, Hyperlipidemia, Hypertension, and MI, old.   History reviewed. No pertinent surgical history.    Exam:   /72 (BP Location: Left arm, Patient Position: Sitting, BP Cuff Size: Adult)   Pulse (!) 110   Temp 36.6 °C (97.9 °F)   Resp 16   Ht 1.6 m (5' 3\")   Wt 73.5 kg (162 lb)   SpO2 94%  Body mass index is 28.7 kg/m².    Constitutional: Alert, no distress.  Skin: Dry, flaky, somewhat yellow-colored rash along scalp and extending to outer ears bilaterally.  Eye: Equal, round and reactive, conjunctiva clear, lids normal.  ENMT: +Mask. Hearing fair.  Neck: Trachea midline, no masses, no thyromegaly.   Respiratory: Unlabored respiratory effort, lungs clear to auscultation, no wheezes, no ronchi. On supplemental oxygen.  Cardiovascular: Normal S1, S2, no murmur, no edema.  Psych: Alert and oriented x3, normal affect and mood.    Assessment and Plan. The following treatment and monitoring plan is recommended:      1. Medicare annual wellness visit, subsequent  HRA reviewed and appropriate.  Patient's previous medical history, healthcare maintenance and immunization status reviewed.  See discussion of anticipatory guidance and individual problems below.  Patient will return annually for Medicare annual well visit.     2. Essential hypertension  3. Benign hypertensive heart disease without heart failure  4. Pure hypercholesterolemia  Chronic and stable.  Patient's blood pressure was borderline high today, but overall improved.  Continue medication " and monitor.    5. COPD, severity to be determined (HCC)  6. Chronic respiratory failure with hypoxia (HCC)  7. Obstructive sleep apnea  Chronic and stable.  [facility]  Continue supplemental oxygen and other medications.    8. Polycythemia rubra vera (HCC)  Chronic and stable.  [facility].    9. Dementia without behavioral disturbance, unspecified dementia type ([facility])  Ongoing issue.  Patient's caregiver is his wife and he has 24/7 support.    10. Rash  Dandruff versus seborrheic dermatitis.  Discussed using Head and Shoulders or Selsun Blue.  Triamcinolone prescribed if needed.      - triamcinolone acetonide (KENALOG) 0.1 % Cream; Apply 1 Application topically 2 times a day.  Dispense: 45 g; Refill: 2    11. Need for vaccination  Flu shot discussed and administered in office today.  - INFLUENZA VACCINE, HIGH DOSE (65+ ONLY)    Services suggested: No services needed at this time  Health Care Screening: Age-appropriate preventive services recommended by USPTF and ACIP covered by Medicare were discussed today. Services ordered if indicated and agreed upon by the patient.  Referrals offered: Community-based lifestyle interventions to reduce health risks and promote self-management and wellness, fall prevention, nutrition, physical activity, tobacco-use cessation, weight loss, and mental health services as per orders if indicated.    Discussion today about general wellness and lifestyle habits:    · Prevent falls and reduce trip hazards; Cautioned about securing or removing rugs.  · Have a working fire alarm and carbon monoxide detector;   · Engage in regular physical activity and social activities     Follow-up: Return in about 6 months (around 5/16/2021) for HTN, Short.

## 2021-01-08 DIAGNOSIS — I10 ESSENTIAL HYPERTENSION: ICD-10-CM

## 2021-01-11 RX ORDER — LISINOPRIL 40 MG/1
TABLET ORAL
Qty: 90 TAB | Refills: 0 | Status: SHIPPED | OUTPATIENT
Start: 2021-01-11 | End: 2021-04-05

## 2021-01-15 DIAGNOSIS — Z23 NEED FOR VACCINATION: ICD-10-CM

## 2021-01-22 DIAGNOSIS — I10 ESSENTIAL HYPERTENSION: ICD-10-CM

## 2021-01-22 RX ORDER — AMLODIPINE BESYLATE 5 MG/1
TABLET ORAL
Qty: 90 TAB | Refills: 0 | Status: SHIPPED | OUTPATIENT
Start: 2021-01-22 | End: 2021-04-16

## 2021-04-05 DIAGNOSIS — I10 ESSENTIAL HYPERTENSION: ICD-10-CM

## 2021-04-05 RX ORDER — LISINOPRIL 40 MG/1
TABLET ORAL
Qty: 90 TABLET | Refills: 1 | Status: SHIPPED | OUTPATIENT
Start: 2021-04-05 | End: 2021-10-06 | Stop reason: SDUPTHER

## 2021-04-16 DIAGNOSIS — I10 ESSENTIAL HYPERTENSION: ICD-10-CM

## 2021-04-16 RX ORDER — AMLODIPINE BESYLATE 5 MG/1
TABLET ORAL
Qty: 9 TABLET | Refills: 1 | Status: SHIPPED | OUTPATIENT
Start: 2021-04-16 | End: 2021-04-26 | Stop reason: SDUPTHER

## 2021-04-26 DIAGNOSIS — I10 ESSENTIAL HYPERTENSION: ICD-10-CM

## 2021-04-26 RX ORDER — AMLODIPINE BESYLATE 5 MG/1
5 TABLET ORAL DAILY
Qty: 90 TABLET | Refills: 1 | Status: SHIPPED | OUTPATIENT
Start: 2021-04-26 | End: 2021-11-02 | Stop reason: SDUPTHER

## 2021-06-08 ENCOUNTER — APPOINTMENT (OUTPATIENT)
Dept: MEDICAL GROUP | Facility: PHYSICIAN GROUP | Age: 76
End: 2021-06-08
Payer: MEDICARE

## 2021-08-10 ENCOUNTER — OFFICE VISIT (OUTPATIENT)
Dept: MEDICAL GROUP | Facility: PHYSICIAN GROUP | Age: 76
End: 2021-08-10
Payer: MEDICARE

## 2021-08-10 VITALS
WEIGHT: 163.2 LBS | OXYGEN SATURATION: 92 % | HEIGHT: 63 IN | DIASTOLIC BLOOD PRESSURE: 68 MMHG | HEART RATE: 99 BPM | BODY MASS INDEX: 28.92 KG/M2 | SYSTOLIC BLOOD PRESSURE: 134 MMHG | TEMPERATURE: 98.8 F

## 2021-08-10 DIAGNOSIS — J96.11 CHRONIC RESPIRATORY FAILURE WITH HYPOXIA (HCC): ICD-10-CM

## 2021-08-10 DIAGNOSIS — I10 ESSENTIAL HYPERTENSION: ICD-10-CM

## 2021-08-10 DIAGNOSIS — D45 POLYCYTHEMIA VERA (HCC): ICD-10-CM

## 2021-08-10 DIAGNOSIS — F03.90 DEMENTIA WITHOUT BEHAVIORAL DISTURBANCE, UNSPECIFIED DEMENTIA TYPE: Chronic | ICD-10-CM

## 2021-08-10 PROCEDURE — 99213 OFFICE O/P EST LOW 20 MIN: CPT | Performed by: FAMILY MEDICINE

## 2021-08-10 ASSESSMENT — PATIENT HEALTH QUESTIONNAIRE - PHQ9: CLINICAL INTERPRETATION OF PHQ2 SCORE: 0

## 2021-08-10 NOTE — PROGRESS NOTES
Subjective:     Chief Complaint   Patient presents with   • Establish Care     prior pcp -Luu       HPI:   Alonso presents today to discuss the following.  Prior PCP: Dr Luu    Essential hypertension  Stable. Monitoring BP at home. Currently taking amlodipine and lisinopril as directed. Also taking baby aspirin. Denies lightheadedness, vision changes, headache, palpitations or leg swelling.      Chronic respiratory failure with hypoxia (HCC)  Chronic issue  He is on home oxygen  Following up with pulmonology for this  He is on 4L/min    Dementia  Chronic issue  Wife endorses cognitive decline  [facility]  Stable.     Polycythemia rubra vera (HCC)  Chronic issue  [facility]      Past Medical History:   Diagnosis Date   • CAD (coronary artery disease)     MI from arterial spasms   • CAD (coronary artery disease)    • Dementia (HCC)    • Diverticulitis    • GI bleed    • Hyperlipidemia    • Hypertension    • MI, old        Current Outpatient Medications Ordered in Epic   Medication Sig Dispense Refill   • amLODIPine (NORVASC) 5 MG Tab Take 1 tablet by mouth every day. 90 tablet 1   • lisinopril (PRINIVIL) 40 MG tablet Take 1 tablet by mouth once daily 90 tablet 1   • triamcinolone acetonide (KENALOG) 0.1 % Cream Apply 1 Application topically 2 times a day. 45 g 2   • mupirocin (BACTROBAN) 2 % Ointment Apply 1 Application to affected area(s) 2 times a day. 1 Tube 0   • docusate sodium (COLACE) 50 MG Cap Take 50 mg by mouth 2 times a day.     • MEGARED OMEGA-3 KRILL  MG Cap Take  by mouth.     • budesonide-formoterol (SYMBICORT) 160-4.5 MCG/ACT Aerosol Inhale 2 Puffs by mouth 2 Times a Day.     • albuterol 108 (90 Base) MCG/ACT Aero Soln inhalation aerosol Inhale 2 Puffs by mouth every 6 hours as needed for Shortness of Breath.     • Lifitegrast (XIIDRA) 5 % Solution by Ophthalmic route.     • Calcium-Vitamin D (CALTRATE 600 PLUS-VIT D PO) Take  by  "mouth.     • colchicine (COLCRYS) 0.6 MG Tab Take 1 Tab by mouth every day. 9 Tab 1   • Multiple Vitamins-Minerals (CENTRUM SILVER ADULT 50+ PO) Take  by mouth.     • Cholecalciferol (VITAMIN D PO) Take  by mouth.     • lovastatin (MEVACOR) 20 MG TABS Take 1 Tab by mouth every day. 30 Tab 3   • aspirin (ASA) 325 MG TABS Take 325 mg by mouth every 6 hours as needed.       No current Epic-ordered facility-administered medications on file.       Allergies:  Vytorin    Health Maintenance: Completed    ROS:  Gen: no fevers/chills, no changes in weight  Eyes: no changes in vision  Pulm: no sob, no cough  CV: no chest pain, no palpitations  GI: no nausea/vomiting, no diarrhea      Objective:     Exam:  /68 (BP Location: Left arm, Patient Position: Sitting, BP Cuff Size: Adult)   Pulse 99   Temp 37.1 °C (98.8 °F) (Temporal)   Ht 1.6 m (5' 3\")   Wt 74 kg (163 lb 3.2 oz)   SpO2 92%   BMI 28.91 kg/m²  Body mass index is 28.91 kg/m².        Constitutional: Alert, no distress, well-groomed.  Skin: Warm, dry, good turgor, no rashes in visible areas.  Eye: Equal, round and reactive, conjunctiva clear, lids normal.  ENMT: Lips without lesions, good dentition, moist mucous membranes.  Neck: Trachea midline, no masses, no thyromegaly.  Respiratory: Unlabored respiratory effort, no cough.  MSK: Normal gait, moves all extremities.  Neuro: Grossly non-focal.   Psych: Alert and oriented x3, normal affect and mood.        Assessment & Plan:     76 y.o. male with the following -     1. Essential hypertension  Chronic, stable condition.  Continue with current regimen.    2. Chronic respiratory failure with hypoxia (HCC)  Chronic, stable condition.  Following up with the [facility] and pulmonology for this.  On home oxygen 4 L/day.  -Continue to follow-up with pulmonology    3. Dementia without behavioral disturbance, unspecified dementia type (HCC)  Chronic, stable condition.  Following up with the [facility] for this issue.  " Stable.    4. Polycythemia rubra vera (HCC)  Chronic, stable condition.  Continue to monitor.  I recommend [facility].    Return in about 6 months (around 2/10/2022).    Please note that this dictation was created using voice recognition software. I have made every reasonable attempt to correct obvious errors, but I expect that there are errors of grammar and possibly content that I did not discover before finalizing the note.

## 2021-08-10 NOTE — ASSESSMENT & PLAN NOTE
Chronic issue  Wife endorses cognitive decline  [facility] needed  Stable.

## 2021-08-10 NOTE — ASSESSMENT & PLAN NOTE
Stable. Monitoring BP at home. Currently taking amlodipine and lisinopril as directed. Also taking baby aspirin. Denies lightheadedness, vision changes, headache, palpitations or leg swelling.

## 2021-10-06 DIAGNOSIS — I10 ESSENTIAL HYPERTENSION: ICD-10-CM

## 2021-10-06 RX ORDER — LISINOPRIL 40 MG/1
TABLET ORAL
Qty: 90 TABLET | Refills: 3 | Status: SHIPPED | OUTPATIENT
Start: 2021-10-06 | End: 2022-09-27

## 2021-11-02 DIAGNOSIS — I10 ESSENTIAL HYPERTENSION: ICD-10-CM

## 2021-11-02 RX ORDER — AMLODIPINE BESYLATE 5 MG/1
5 TABLET ORAL DAILY
Qty: 90 TABLET | Refills: 3 | Status: SHIPPED | OUTPATIENT
Start: 2021-11-02 | End: 2022-10-21

## 2022-01-28 ENCOUNTER — TELEPHONE (OUTPATIENT)
Dept: MEDICAL GROUP | Facility: PHYSICIAN GROUP | Age: 77
End: 2022-01-28

## 2022-01-28 NOTE — TELEPHONE ENCOUNTER
Phone Number Called: [phone]      Call outcome: Spoke to patient regarding message below.    Message: Spoke to Zina and informed her Patient did not receive flu vacc at the time of his appt.

## 2022-01-28 NOTE — TELEPHONE ENCOUNTER
VOICEMAIL  1. Caller Name: Zina (okay to discuss Treatment)                       Call Back Number: [phone]    2. Message:Zina (wife) called in regards to Patient wanted to know if patient received flu vaccine day of his appt.    3. Patient approves office to leave a detailed voicemail/MyChart message: N\A

## 2022-04-14 ENCOUNTER — TELEPHONE (OUTPATIENT)
Dept: MEDICAL GROUP | Facility: PHYSICIAN GROUP | Age: 77
End: 2022-04-14
Payer: MEDICARE

## 2022-04-14 NOTE — TELEPHONE ENCOUNTER
Phone Number Called: [phone]    Call outcome:  Spoke with spouse Johanny     Message: Informed her this request requires an appt.  Scheduled an appt for Pt on 05/10/22 @ 4:00 pm.

## 2022-04-14 NOTE — TELEPHONE ENCOUNTER
VOICEMAIL  1. Caller Name: Johanny Chang                      Call Back Number: [phone]    2. Message: Spouse is leaving to go to the [facility] and daughter will need to watch the Pt. The daughter's work is requiring a letter from the Pt's PCP stating due to his health conditions that the Pt cannot be left alone and will need 24 hour care, in order for them to approve her leave of absence from work.  Will the Pt need an appt? Please advise.

## 2022-05-10 ENCOUNTER — OFFICE VISIT (OUTPATIENT)
Dept: MEDICAL GROUP | Facility: PHYSICIAN GROUP | Age: 77
End: 2022-05-10
Payer: MEDICARE

## 2022-05-10 VITALS
WEIGHT: 166 LBS | OXYGEN SATURATION: 91 % | HEIGHT: 63 IN | BODY MASS INDEX: 29.41 KG/M2 | SYSTOLIC BLOOD PRESSURE: 106 MMHG | TEMPERATURE: 98.7 F | DIASTOLIC BLOOD PRESSURE: 64 MMHG | HEART RATE: 102 BPM

## 2022-05-10 DIAGNOSIS — J96.11 CHRONIC RESPIRATORY FAILURE WITH HYPOXIA (HCC): ICD-10-CM

## 2022-05-10 DIAGNOSIS — F03.90 DEMENTIA WITHOUT BEHAVIORAL DISTURBANCE, UNSPECIFIED DEMENTIA TYPE: Chronic | ICD-10-CM

## 2022-05-10 PROCEDURE — 99214 OFFICE O/P EST MOD 30 MIN: CPT | Performed by: FAMILY MEDICINE

## 2022-05-10 RX ORDER — FLUTICASONE PROPIONATE AND SALMETEROL 250; 50 UG/1; UG/1
POWDER RESPIRATORY (INHALATION)
COMMUNITY
Start: 2022-03-18

## 2022-05-10 NOTE — ASSESSMENT & PLAN NOTE
Chronic issue  He is on home oxygen and following up with pulmonology  He is on 4L/min   [facility]  He is on wixela.

## 2022-05-10 NOTE — ASSESSMENT & PLAN NOTE
Chronic issue  [facility]  Wife is the caregiver  However she will be leaving to the [facility] and FMLA is needed for their daughter to care for him.

## 2022-05-10 NOTE — PROGRESS NOTES
Subjective:     Chief Complaint   Patient presents with   • Paperwork     Respite Care       HPI:   Alonso presents today to discuss the following.    Chronic respiratory failure with hypoxia (HCC)  Chronic issue  He is on home oxygen and following up with pulmonology  He is on 4L/min   [facility]  He is on wixela.     Dementia  Chronic issue  [facility]  Wife is the caregiver  However she will be leaving to the [facility] and FMLA is needed for their daughter to care for him.       Past Medical History:   Diagnosis Date   • CAD (coronary artery disease)     MI from arterial spasms   • CAD (coronary artery disease)    • Dementia (HCC)    • Diverticulitis    • GI bleed    • Hyperlipidemia    • Hypertension    • MI, old        Current Outpatient Medications Ordered in Epic   Medication Sig Dispense Refill   • fluticasone-salmeterol (ADVAIR) 250-50 MCG/ACT AEROSOL POWDER, BREATH ACTIVATED      • amLODIPine (NORVASC) 5 MG Tab Take 1 Tablet by mouth every day. 90 Tablet 3   • lisinopril (PRINIVIL) 40 MG tablet Take 1 tablet by mouth once daily 90 Tablet 3   • triamcinolone acetonide (KENALOG) 0.1 % Cream Apply 1 Application topically 2 times a day. 45 g 2   • docusate sodium (COLACE) 50 MG Cap Take 50 mg by mouth 2 times a day.     • albuterol 108 (90 Base) MCG/ACT Aero Soln inhalation aerosol Inhale 2 Puffs by mouth every 6 hours as needed for Shortness of Breath.     • Lifitegrast 5 % Solution by Ophthalmic route.     • Calcium-Vitamin D (CALTRATE 600 PLUS-VIT D PO) Take  by mouth.     • colchicine (COLCRYS) 0.6 MG Tab Take 1 Tab by mouth every day. 9 Tab 1   • Multiple Vitamins-Minerals (CENTRUM SILVER ADULT 50+ PO) Take  by mouth.     • Cholecalciferol (VITAMIN D PO) Take  by mouth.     • lovastatin (MEVACOR) 20 MG TABS Take 1 Tab by mouth every day. 30 Tab 3   • aspirin (ASA) 325 MG TABS Take 325 mg by mouth every 6 hours as needed.       No current Epic-ordered  "facility-administered medications on file.       Allergies:  Vytorin    Health Maintenance: Completed    ROS:  Gen: no fevers/chills, no changes in weight  Eyes: no changes in vision  Pulm: no sob, no cough  CV: no chest pain, no palpitations  GI: no nausea/vomiting, no diarrhea      Objective:     Exam:  /64 (BP Location: Left arm, Patient Position: Sitting, BP Cuff Size: Adult)   Pulse (!) 102   Temp 37.1 °C (98.7 °F) (Temporal)   Ht 1.6 m (5' 3\")   Wt 75.3 kg (166 lb)   SpO2 91%   BMI 29.41 kg/m²  Body mass index is 29.41 kg/m².      Constitutional: Alert, no distress, well-groomed.  Skin: Warm, dry, good turgor, no rashes in visible areas.  Eye: Equal, round and reactive, conjunctiva clear, lids normal.  ENMT: Lips without lesions, good dentition, moist mucous membranes.  Neck: Trachea midline, no masses, no thyromegaly.  Respiratory: Unlabored respiratory effort, no cough.  MSK: Normal gait, moves all extremities.  Neuro: Grossly non-focal.   Psych: Alert and oriented x3, normal affect and mood.        Assessment & Plan:     77 y.o. male with the following -     1. Chronic respiratory failure with hypoxia (HCC)  Chronic, stable condition.  Continue with home oxygen.  Continue to follow-up with pulmonology at the [facility].  Continue with Wixela.    2. Dementia without behavioral disturbance, unspecified dementia type (HCC)  Chronic, stable condition.  The patient suffers from severe dementia.  Wife is the caregiver.  However she plans on going to the [facility] to visit a family member and thus their daughter is required to be full-time caregiver for him.  [facility]'s paperwork is requested for their daughter to be off work for a couple of weeks.  They do not know the exact date but will return with it along with paperwork for me to fill out.  This will be authorized.  Continue to follow-up with the [facility] for his dementia.      Return in about 6 months (around 11/10/2022).    Please note that " this dictation was created using voice recognition software. I have made every reasonable attempt to correct obvious errors, but I expect that there are errors of grammar and possibly content that I did not discover before finalizing the note.

## 2022-09-27 ENCOUNTER — OFFICE VISIT (OUTPATIENT)
Dept: MEDICAL GROUP | Facility: PHYSICIAN GROUP | Age: 77
End: 2022-09-27
Payer: MEDICARE

## 2022-09-27 VITALS
OXYGEN SATURATION: 94 % | BODY MASS INDEX: 27.11 KG/M2 | HEART RATE: 97 BPM | TEMPERATURE: 98.3 F | DIASTOLIC BLOOD PRESSURE: 64 MMHG | HEIGHT: 63 IN | WEIGHT: 153 LBS | SYSTOLIC BLOOD PRESSURE: 118 MMHG

## 2022-09-27 DIAGNOSIS — M10.061 ACUTE IDIOPATHIC GOUT OF RIGHT KNEE: ICD-10-CM

## 2022-09-27 DIAGNOSIS — I10 ESSENTIAL HYPERTENSION: ICD-10-CM

## 2022-09-27 DIAGNOSIS — F03.90 DEMENTIA WITHOUT BEHAVIORAL DISTURBANCE, UNSPECIFIED DEMENTIA TYPE: Chronic | ICD-10-CM

## 2022-09-27 PROCEDURE — 99214 OFFICE O/P EST MOD 30 MIN: CPT | Performed by: FAMILY MEDICINE

## 2022-09-27 RX ORDER — ATORVASTATIN CALCIUM 20 MG/1
TABLET, FILM COATED ORAL
COMMUNITY
Start: 2022-09-08

## 2022-09-27 RX ORDER — COLCHICINE 0.6 MG/1
0.6 TABLET ORAL DAILY
Qty: 9 TABLET | Refills: 1 | Status: SHIPPED | OUTPATIENT
Start: 2022-09-27

## 2022-09-27 RX ORDER — LISINOPRIL 40 MG/1
TABLET ORAL
Qty: 90 TABLET | Refills: 0 | Status: SHIPPED | OUTPATIENT
Start: 2022-09-27 | End: 2022-12-28

## 2022-09-27 ASSESSMENT — PATIENT HEALTH QUESTIONNAIRE - PHQ9: CLINICAL INTERPRETATION OF PHQ2 SCORE: 0

## 2022-09-27 NOTE — ASSESSMENT & PLAN NOTE
Chronic issue  Wife is caregiver but will be leaving to the [facility] 11/20-12/30 and daughter will be the caregiver and needs [facility] paperwork to get filled out.

## 2022-09-27 NOTE — PROGRESS NOTES
Subjective:     Chief Complaint   Patient presents with    Paperwork     [facility]       HPI:   Alonso presents today to discuss the following.    Dementia  Chronic issue  Wife is caregiver but will be leaving to the [facility] 11/20-12/30 and daughter will be the caregiver and needs [facility] paperwork to get filled out.      Past Medical History:   Diagnosis Date    CAD (coronary artery disease)     MI from arterial spasms    CAD (coronary artery disease)     Dementia (HCC)     Diverticulitis     GI bleed     Hyperlipidemia     Hypertension     MI, old        Current Outpatient Medications Ordered in Epic   Medication Sig Dispense Refill    lisinopril (PRINIVIL) 40 MG tablet TAKE 1 TABLET BY MOUTH EVERY DAY 90 Tablet 0    atorvastatin (LIPITOR) 20 MG Tab       colchicine (COLCRYS) 0.6 MG Tab Take 1 Tablet by mouth every day. 9 Tablet 1    fluticasone-salmeterol (ADVAIR) 250-50 MCG/ACT AEROSOL POWDER, BREATH ACTIVATED       amLODIPine (NORVASC) 5 MG Tab Take 1 Tablet by mouth every day. 90 Tablet 3    triamcinolone acetonide (KENALOG) 0.1 % Cream Apply 1 Application topically 2 times a day. 45 g 2    docusate sodium (COLACE) 50 MG Cap Take 50 mg by mouth 2 times a day.      albuterol 108 (90 Base) MCG/ACT Aero Soln inhalation aerosol Inhale 2 Puffs by mouth every 6 hours as needed for Shortness of Breath.      Lifitegrast 5 % Solution by Ophthalmic route.      Calcium-Vitamin D (CALTRATE 600 PLUS-VIT D PO) Take  by mouth.      Multiple Vitamins-Minerals (CENTRUM SILVER ADULT 50+ PO) Take  by mouth.      Cholecalciferol (VITAMIN D PO) Take  by mouth.      aspirin (ASA) 325 MG TABS Take 325 mg by mouth every 6 hours as needed.       No current Epic-ordered facility-administered medications on file.       Allergies:  Vytorin    Health Maintenance: Completed    ROS:  Gen: no fevers/chills, no changes in weight  Eyes: no changes in vision  Pulm: no sob, no cough  CV: no chest pain, no palpitations  GI: no nausea/vomiting, no  "diarrhea      Objective:     Exam:  /64 (BP Location: Left arm, Patient Position: Sitting, BP Cuff Size: Adult)   Pulse 97   Temp 36.8 °C (98.3 °F) (Temporal)   Ht 1.6 m (5' 3\")   Wt 69.4 kg (153 lb)   SpO2 94%   BMI 27.10 kg/m²  Body mass index is 27.1 kg/m².      Constitutional: Alert, no distress, well-groomed.  Skin: Warm, dry, good turgor, no rashes in visible areas.  Eye: Equal, round and reactive, conjunctiva clear, lids normal.  ENMT: Lips without lesions, good dentition, moist mucous membranes.  Neck: Trachea midline, no masses, no thyromegaly.  Respiratory: Unlabored respiratory effort, no cough.  MSK: Normal gait, moves all extremities.  Neuro: Grossly non-focal.   Psych: Alert and oriented x3, normal affect and mood.        Assessment & Plan:     77 y.o. male with the following -     1. Dementia without behavioral disturbance, unspecified dementia type (HCC)  Chronic condition.  Continue with care at home with supervision.  I will fill out [facility] paperwork for daughter to be caregiver from November 20 to December 30 2022.    2. Acute idiopathic gout of right knee  Chronic condition.  Flaring.  We will treat with colchicine.  - colchicine (COLCRYS) 0.6 MG Tab; Take 1 Tablet by mouth every day.  Dispense: 9 Tablet; Refill: 1      No follow-ups on file.          Please note that this dictation was created using voice recognition software. I have made every reasonable attempt to correct obvious errors, but I expect that there are errors of grammar and possibly content that I did not discover before finalizing the note.        "

## 2022-10-21 ENCOUNTER — TELEMEDICINE (OUTPATIENT)
Dept: MEDICAL GROUP | Facility: PHYSICIAN GROUP | Age: 77
End: 2022-10-21
Payer: MEDICARE

## 2022-10-21 VITALS
HEART RATE: 81 BPM | WEIGHT: 160 LBS | DIASTOLIC BLOOD PRESSURE: 61 MMHG | BODY MASS INDEX: 27.31 KG/M2 | SYSTOLIC BLOOD PRESSURE: 115 MMHG | OXYGEN SATURATION: 91 % | HEIGHT: 64 IN

## 2022-10-21 DIAGNOSIS — J96.11 CHRONIC RESPIRATORY FAILURE WITH HYPOXIA (HCC): ICD-10-CM

## 2022-10-21 DIAGNOSIS — I10 ESSENTIAL HYPERTENSION: ICD-10-CM

## 2022-10-21 PROCEDURE — 99214 OFFICE O/P EST MOD 30 MIN: CPT | Mod: 95 | Performed by: FAMILY MEDICINE

## 2022-10-21 RX ORDER — AMLODIPINE BESYLATE 5 MG/1
5 TABLET ORAL DAILY
Qty: 90 TABLET | Refills: 3 | Status: SHIPPED | OUTPATIENT
Start: 2022-10-21 | End: 2023-10-25 | Stop reason: SDUPTHER

## 2022-10-21 NOTE — PROGRESS NOTES
Virtual Visit: Established Patient   This visit was conducted via Zoom using secure and encrypted videoconferencing technology.   The patient was in their home in the state [facility].    The patient's identity was confirmed and verbal consent was obtained for this virtual visit.     Subjective:   CC:   Chief Complaint   Patient presents with    Oxygen Dependency     O2 sat is low    Referral Needed     Pulmonologist       Alonso Chnag Jr. is a 77 y.o. 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   He is doing well    FMLA has changed - 10/10/22-11/16/22.         ROS   neg    Current medicines (including changes today)  Current Outpatient Medications   Medication Sig Dispense Refill    lisinopril (PRINIVIL) 40 MG tablet TAKE 1 TABLET BY MOUTH EVERY DAY 90 Tablet 0    atorvastatin (LIPITOR) 20 MG Tab       colchicine (COLCRYS) 0.6 MG Tab Take 1 Tablet by mouth every day. 9 Tablet 1    fluticasone-salmeterol (ADVAIR) 250-50 MCG/ACT AEROSOL POWDER, BREATH ACTIVATED       amLODIPine (NORVASC) 5 MG Tab Take 1 Tablet by mouth every day. 90 Tablet 3    triamcinolone acetonide (KENALOG) 0.1 % Cream Apply 1 Application topically 2 times a day. 45 g 2    Lifitegrast 5 % Solution by Ophthalmic route.      Calcium-Vitamin D (CALTRATE 600 PLUS-VIT D PO) Take  by mouth.      Multiple Vitamins-Minerals (CENTRUM SILVER ADULT 50+ PO) Take  by mouth.      Cholecalciferol (VITAMIN D PO) Take  by mouth.      aspirin (ASA) 325 MG TABS Take 325 mg by mouth every 6 hours as needed.       No current facility-administered medications for this visit.       Patient Active Problem List    Diagnosis Date Noted    Chronic respiratory failure with hypoxia ([facility]) 11/02/2018    Obstructive sleep apnea 11/02/2018    COPD, severity to be determined ([facility]) 11/02/2018    Acute  "seasonal allergic rhinitis due to pollen 04/04/2018    Pulmonary nodule seen on imaging study 01/07/2015    Polycythemia rubra vera (HCC) 01/07/2015    Old myocardial infarction, presumed vasospastic with no coronary arterial obstruction, 2002 05/20/2014    Benign hypertensive heart disease without heart failure 05/20/2014    Seborrheic dermatitis 04/16/2014    Dementia (HCC) 04/16/2014    Essential hypertension 04/16/2014    Pure hypercholesterolemia 04/16/2014    Diverticulosis 04/16/2014        Objective:   /61 Comment: Patient reported  Pulse 81 Comment: Patient reported  Ht 1.626 m (5' 4\") Comment: Patient reported  Wt 72.6 kg (160 lb) Comment: Patient reported  SpO2 91% Comment: Patient reported  BMI 27.46 kg/m²     Physical Exam:  Constitutional: Alert, no distress, well-groomed.  Skin: No rashes in visible areas.  Eye: Round. Conjunctiva clear, lids normal. No icterus.   ENMT: Lips pink without lesions, good dentition, moist mucous membranes. Phonation normal.  Neck: No masses, no thyromegaly. Moves freely without pain.  Respiratory: Unlabored respiratory effort, no cough or audible wheeze  Psych: Alert and oriented x3, normal affect and mood.     Assessment and Plan:   The following treatment plan was discussed:     1. Chronic respiratory failure with hypoxia (HCC)  Chronic, worsening condition.  The patient's daughters mentioned that he has been having low oxygen levels as low as 85% but these are short-lived episodes.  I have provided them with strict emergency room precautions as this is not normal.  He is only on home oxygen at night.  Unsure of how long this problem has been going on but in our office he has always been normal range.  I recommend that they troubleshoot their device to make sure that this is indeed accurate.  I also gave them the warning that if his fingers are cold the readings will be an accurate.  Strict emergency room precautions given.  I will also fill out FMLA " paperwork for his daughter who is being a caregiver since October and will do so until November when the patient's wife returns from the [facility].  I will also refer to pulmonologist.  - Referral to Pulmonary and Sleep Medicine    Follow-up: No follow-ups on file.

## 2022-11-09 ENCOUNTER — TELEPHONE (OUTPATIENT)
Dept: MEDICAL GROUP | Facility: PHYSICIAN GROUP | Age: 77
End: 2022-11-09
Payer: MEDICARE

## 2022-11-10 ENCOUNTER — PATIENT MESSAGE (OUTPATIENT)
Dept: HEALTH INFORMATION MANAGEMENT | Facility: OTHER | Age: 77
End: 2022-11-10

## 2023-01-09 ENCOUNTER — TELEPHONE (OUTPATIENT)
Dept: MEDICAL GROUP | Facility: PHYSICIAN GROUP | Age: 78
End: 2023-01-09
Payer: MEDICARE

## 2023-01-24 ENCOUNTER — OFFICE VISIT (OUTPATIENT)
Dept: MEDICAL GROUP | Facility: PHYSICIAN GROUP | Age: 78
End: 2023-01-24
Payer: MEDICARE

## 2023-01-24 VITALS
WEIGHT: 157 LBS | DIASTOLIC BLOOD PRESSURE: 64 MMHG | OXYGEN SATURATION: 96 % | HEART RATE: 92 BPM | TEMPERATURE: 98.2 F | SYSTOLIC BLOOD PRESSURE: 122 MMHG | BODY MASS INDEX: 26.8 KG/M2 | HEIGHT: 64 IN

## 2023-01-24 DIAGNOSIS — J96.11 CHRONIC RESPIRATORY FAILURE WITH HYPOXIA (HCC): ICD-10-CM

## 2023-01-24 DIAGNOSIS — D45 POLYCYTHEMIA VERA (HCC): ICD-10-CM

## 2023-01-24 PROCEDURE — 99214 OFFICE O/P EST MOD 30 MIN: CPT | Performed by: FAMILY MEDICINE

## 2023-01-24 ASSESSMENT — PATIENT HEALTH QUESTIONNAIRE - PHQ9: CLINICAL INTERPRETATION OF PHQ2 SCORE: 0

## 2023-01-24 NOTE — PROGRESS NOTES
Subjective:     Chief Complaint   Patient presents with    Follow-Up     Following up on breathing issues and referral       HPI:   Alonso presents today to discuss the following.    Chronic respiratory failure with hypoxia (HCC)  Chronic issue  Stable on home oxygen  4l/min  [facility]    Polycythemia rubra vera (HCC)  Chronic issue  [facility]  However per insurance they are requesting hematology referral within [facility]     Past Medical History:   Diagnosis Date    CAD (coronary artery disease)     MI from arterial spasms    CAD (coronary artery disease)     Dementia (HCC)     Diverticulitis     GI bleed     Hyperlipidemia     Hypertension     MI, old        Current Outpatient Medications Ordered in Epic   Medication Sig Dispense Refill    lisinopril (PRINIVIL) 40 MG tablet TAKE 1 TABLET BY MOUTH EVERY DAY 90 Tablet 3    amLODIPine (NORVASC) 5 MG Tab TAKE 1 TABLET BY MOUTH EVERY DAY 90 Tablet 3    atorvastatin (LIPITOR) 20 MG Tab       colchicine (COLCRYS) 0.6 MG Tab Take 1 Tablet by mouth every day. 9 Tablet 1    fluticasone-salmeterol (ADVAIR) 250-50 MCG/ACT AEROSOL POWDER, BREATH ACTIVATED       triamcinolone acetonide (KENALOG) 0.1 % Cream Apply 1 Application topically 2 times a day. 45 g 2    Lifitegrast 5 % Solution by Ophthalmic route.      Calcium-Vitamin D (CALTRATE 600 PLUS-VIT D PO) Take  by mouth.      Multiple Vitamins-Minerals (CENTRUM SILVER ADULT 50+ PO) Take  by mouth.      Cholecalciferol (VITAMIN D PO) Take  by mouth.      aspirin (ASA) 325 MG TABS Take 325 mg by mouth every 6 hours as needed.       No current Epic-ordered facility-administered medications on file.       Allergies:  Ezetimibe-simvastatin and Vytorin    Health Maintenance: Completed    ROS:  Gen: no fevers/chills, no changes in weight  Eyes: no changes in vision  Pulm: no sob, no cough  CV: no chest pain, no palpitations  GI: no nausea/vomiting, no diarrhea      Objective:     Exam:  /64 (BP  "Location: Left arm, Patient Position: Sitting, BP Cuff Size: Adult)   Pulse 92   Temp 36.8 °C (98.2 °F) (Temporal)   Ht 1.626 m (5' 4\")   Wt 71.2 kg (157 lb)   SpO2 96%   BMI 26.95 kg/m²  Body mass index is 26.95 kg/m².      Constitutional: Alert, no distress, well-groomed.  Skin: Warm, dry, good turgor, no rashes in visible areas.  Eye: Equal, round and reactive, conjunctiva clear, lids normal.  ENMT: Lips without lesions, good dentition, moist mucous membranes.  Neck: Trachea midline, no masses, no thyromegaly.  Respiratory: Unlabored respiratory effort, no cough.  MSK: Normal gait, moves all extremities.  Neuro: Grossly non-focal.   Psych: Alert and oriented x3, normal affect and mood.        Assessment & Plan:     78 y.o. male with the following -     1. Chronic respiratory failure with hypoxia (HCC)  Chronic, stable condition.  I recommend he continues to follow-up with pulmonology regarding this issue.  Continue with home oxygen at 4 L/min.  Vitals are stable in the office today.  Continue with [facility].    2. Polycythemia rubra vera (HCC)  Chronic, stable condition.  The patient would like to establish with hematology locally to continue to address his polycythemia.  [facility] but [facility] now requires [facility] to [facility] for coverage.  I will place a new referral to hematology today.  - Referral to Hematology Oncology      Return in about 3 months (around 4/24/2023).          Please note that this dictation was created using voice recognition software. I have made every reasonable attempt to correct obvious errors, but I expect that there are errors of grammar and possibly content that I did not discover before finalizing the note.        "

## 2023-01-24 NOTE — ASSESSMENT & PLAN NOTE
Chronic issue  [facility]  [facility]

## 2023-04-10 NOTE — PROGRESS NOTES
04/18/23    Subjective    Chief Complaint:  Polycythemia    HPI:  78 male referred for consultation by Dr. Salvador Caldera because of polycythemia. He was apparently seen for this in 2016 by Dr. 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 He was a smoker at that time.  [facility]. He does carry a dx of COPD and MAURICIO. Was getting monthly therapeutic phlebotomies thru [facility] (for [facility]) but now they charge $250 a time.     ROS:    Constitutional: No weight loss  Skin: No rash or jaundice  HENT: No change in eyesight or hearing  Cardiovascular:No chest pain or arrythmia  Respiratory:No cough or SOB  GI:No nausea, vomiting, diarrhea, constipation  :No dysuria or frequency  Musculoskeletal:No bone or joint pain  Neuro:No sx's of neuropathy  Psych: No complaints    PMH:      Allergies   Allergen Reactions    Ezetimibe-Simvastatin      Other reaction(s): Unspecified  Per [facility] records, patient is allergic to Vytorin    Vytorin Unspecified     Per [facility] records, patient is allergic to Vytorin       Past Medical History:   Diagnosis Date    CAD (coronary artery disease)     MI from arterial spasms    CAD (coronary artery disease)     Dementia (HCC)     Diverticulitis     GI bleed     Hyperlipidemia     Hypertension     MI, old         History reviewed. No pertinent surgical history.     Medications:    Current Outpatient Medications on File Prior to Encounter   Medication Sig Dispense Refill    lisinopril (PRINIVIL) 40 MG tablet TAKE 1 TABLET BY MOUTH EVERY DAY 90 Tablet 3    amLODIPine (NORVASC) 5 MG Tab TAKE 1 TABLET BY MOUTH EVERY DAY 90 Tablet 3    atorvastatin (LIPITOR) 20 MG Tab       colchicine (COLCRYS) 0.6 MG Tab Take 1 Tablet by mouth every day. 9 Tablet 1    fluticasone-salmeterol (ADVAIR) 250-50 MCG/ACT AEROSOL POWDER, BREATH ACTIVATED       triamcinolone acetonide (KENALOG) 0.1 % Cream Apply 1 Application topically 2 times  "a day. 45 g 2    Lifitegrast 5 % Solution by Ophthalmic route.      Calcium-Vitamin D (CALTRATE 600 PLUS-VIT D PO) Take  by mouth.      Multiple Vitamins-Minerals (CENTRUM SILVER ADULT 50+ PO) Take  by mouth.      Cholecalciferol (VITAMIN D PO) Take  by mouth.      aspirin (ASA) 325 MG TABS Take 325 mg by mouth every 6 hours as needed.       No current facility-administered medications on file prior to encounter.       Social History     Tobacco Use    Smoking status: Former     Packs/day: 1.50     Years: 45.00     Pack years: 67.50     Types: Cigarettes     Quit date: 2002     Years since quittin.0    Smokeless tobacco: Never   Substance Use Topics    Alcohol use: Yes     Alcohol/week: 0.0 oz     Comment: occasional beer        Family History   Problem Relation Age of Onset    Hypertension Mother     Hypertension Father         Objective    Vitals:    /70 (BP Location: Right arm, Patient Position: Sitting, BP Cuff Size: Adult)   Pulse 100   Temp 36.3 °C (97.3 °F)   Resp 20   Ht 1.626 m (5' 4\")   Wt 71.5 kg (157 lb 11.1 oz)   SpO2 96% Comment: w/ 4 L of o2  BMI 27.07 kg/m²     Physical Exam:    Appears well-developed but looks chronically ill - on O2 at 4L/min.. No distress.    Head -  Normocephalic .   Eyes - Pupils are equal. Conjunctivae normal. No scleral icterus.   Ears - normal hearing  Neck - Neck supple. No thyromegaly  Cardiovascular - Normal rate, regular rhythm, normal heart sounds and intact distal pulses. No  gallop, murmur or rub  Pulmonary - Normal breath sounds.  No wheeze, rales or rhonchi  Abdominal -Soft. No distension, tenderness, organomegaly or mass  Extremities-  No edema or tenderness.    Nodes - No submental, submandibular, preauricular, cervical, axillary or inguinal adenopathy.    Neurological -   Alert and oriented.  Skin - Skin is warm and dry. No rash noted. Not diaphoretic. No erythema. No pallor. No jaundice   Psychiatric -  Normal mood and affect.    Labs:     " Latest Reference Range & Units 04/16/14 15:16 05/20/14 12:35 10/24/15 09:18 01/12/16 08:36   WBC 4.8 - 10.8 K/uL 6.8 7.4 6.9 8.0   RBC 4.70 - 6.10 M/uL 5.85 5.92 6.27 (H) 5.98   Hemoglobin 14.0 - 18.0 g/dL 18.2 (H) 18.5 (H) 19.9 (H) 19.1 (H)   Hematocrit 42.0 - 52.0 % 55.8 (H) 55.5 (H) 60.3 (H) 59.1 (H)   MCV 81.4 - 97.8 fL 95.4 93.8 96.2 98.8 (H)   MCH 27.0 - 33.0 pg 31.1 31.3 31.7 31.9   MCHC 33.7 - 35.3 g/dL 32.6 33.3 33.0 (L) 32.3 (L)   RDW 35.9 - 50.0 fL 15.5 15.8 50.5 (H) 50.2 (H)   Platelet Count 164 - 446 K/uL 286 275 265 319      Latest Reference Range & Units 01/12/16 08:36   Erythropoietin 4 - 27 mU/mL 14      Latest Reference Range & Units 01/12/16 08:36   JAK2 [id number] Mutation % 0.0     Assessment    Imp:    Visit Diagnosis:    1. Polycythemia        2. Chronic respiratory failure with hypoxia (HCC)        3. Obstructive sleep apnea          Plan:  Therapeutic phlebotomy once a month  Recheck in 3 months    Lester Dickerson M.D.

## 2023-04-18 ENCOUNTER — HOSPITAL ENCOUNTER (OUTPATIENT)
Dept: HEMATOLOGY ONCOLOGY | Facility: MEDICAL CENTER | Age: 78
End: 2023-04-18
Attending: INTERNAL MEDICINE
Payer: MEDICARE

## 2023-04-18 VITALS
DIASTOLIC BLOOD PRESSURE: 70 MMHG | SYSTOLIC BLOOD PRESSURE: 124 MMHG | BODY MASS INDEX: 26.92 KG/M2 | RESPIRATION RATE: 20 BRPM | OXYGEN SATURATION: 96 % | WEIGHT: 157.69 LBS | TEMPERATURE: 97.3 F | HEIGHT: 64 IN | HEART RATE: 100 BPM

## 2023-04-18 DIAGNOSIS — G47.33 OBSTRUCTIVE SLEEP APNEA: ICD-10-CM

## 2023-04-18 DIAGNOSIS — D75.1 POLYCYTHEMIA: ICD-10-CM

## 2023-04-18 DIAGNOSIS — J96.11 CHRONIC RESPIRATORY FAILURE WITH HYPOXIA (HCC): ICD-10-CM

## 2023-04-18 PROCEDURE — 99212 OFFICE O/P EST SF 10 MIN: CPT | Performed by: INTERNAL MEDICINE

## 2023-04-18 PROCEDURE — 99203 OFFICE O/P NEW LOW 30 MIN: CPT | Performed by: INTERNAL MEDICINE

## 2023-04-18 RX ORDER — SODIUM CHLORIDE 9 MG/ML
500 INJECTION, SOLUTION INTRAVENOUS ONCE
Status: CANCELLED | OUTPATIENT
Start: 2023-04-25 | End: 2023-04-25

## 2023-04-18 ASSESSMENT — PAIN SCALES - GENERAL: PAINLEVEL: NO PAIN

## 2023-04-25 ENCOUNTER — OFFICE VISIT (OUTPATIENT)
Dept: MEDICAL GROUP | Facility: PHYSICIAN GROUP | Age: 78
End: 2023-04-25
Payer: MEDICARE

## 2023-04-25 VITALS
SYSTOLIC BLOOD PRESSURE: 122 MMHG | BODY MASS INDEX: 25.44 KG/M2 | TEMPERATURE: 98.4 F | WEIGHT: 149 LBS | OXYGEN SATURATION: 94 % | HEART RATE: 82 BPM | DIASTOLIC BLOOD PRESSURE: 60 MMHG | HEIGHT: 64 IN

## 2023-04-25 DIAGNOSIS — F02.B0 MODERATE ALZHEIMER'S DEMENTIA, UNSPECIFIED TIMING OF DEMENTIA ONSET, UNSPECIFIED WHETHER BEHAVIORAL, PSYCHOTIC, OR MOOD DISTURBANCE OR ANXIETY (HCC): ICD-10-CM

## 2023-04-25 DIAGNOSIS — J44.9 COPD, SEVERITY TO BE DETERMINED (HCC): ICD-10-CM

## 2023-04-25 DIAGNOSIS — Z00.00 MEDICARE ANNUAL WELLNESS VISIT, SUBSEQUENT: Primary | ICD-10-CM

## 2023-04-25 DIAGNOSIS — G30.9 MODERATE ALZHEIMER'S DEMENTIA, UNSPECIFIED TIMING OF DEMENTIA ONSET, UNSPECIFIED WHETHER BEHAVIORAL, PSYCHOTIC, OR MOOD DISTURBANCE OR ANXIETY (HCC): ICD-10-CM

## 2023-04-25 DIAGNOSIS — D45 POLYCYTHEMIA VERA (HCC): ICD-10-CM

## 2023-04-25 DIAGNOSIS — I10 ESSENTIAL HYPERTENSION: ICD-10-CM

## 2023-04-25 DIAGNOSIS — E78.00 PURE HYPERCHOLESTEROLEMIA: ICD-10-CM

## 2023-04-25 PROCEDURE — G0439 PPPS, SUBSEQ VISIT: HCPCS | Performed by: FAMILY MEDICINE

## 2023-04-25 ASSESSMENT — ENCOUNTER SYMPTOMS: GENERAL WELL-BEING: GOOD

## 2023-04-25 ASSESSMENT — PATIENT HEALTH QUESTIONNAIRE - PHQ9: CLINICAL INTERPRETATION OF PHQ2 SCORE: 0

## 2023-04-25 ASSESSMENT — ACTIVITIES OF DAILY LIVING (ADL): BATHING_REQUIRES_ASSISTANCE: 0

## 2023-04-25 NOTE — PROGRESS NOTES
Chief Complaint   Patient presents with    Medicare Annual Wellness       HPI:  Alonso is a 78 y.o. here for Medicare Annual Wellness Visit      Patient Active Problem List    Diagnosis Date Noted    Chronic respiratory failure with hypoxia ([facility]) 11/02/2018    Obstructive sleep apnea 11/02/2018    COPD, severity to be determined ([facility]) 11/02/2018    Acute seasonal allergic rhinitis due to pollen 04/04/2018    Pulmonary nodule seen on imaging study 01/07/2015    Polycythemia rubra vera ([facility]) 01/07/2015    Old myocardial infarction, presumed vasospastic with no coronary arterial obstruction, 2002 05/20/2014    Benign hypertensive heart disease without heart failure 05/20/2014    Seborrheic dermatitis 04/16/2014    Dementia ([facility]) 04/16/2014    Essential hypertension 04/16/2014    Pure hypercholesterolemia 04/16/2014    Diverticulosis 04/16/2014       Current Outpatient Medications   Medication Sig Dispense Refill    lisinopril (PRINIVIL) 40 MG tablet TAKE 1 TABLET BY MOUTH EVERY DAY 90 Tablet 3    amLODIPine (NORVASC) 5 MG Tab TAKE 1 TABLET BY MOUTH EVERY DAY 90 Tablet 3    atorvastatin (LIPITOR) 20 MG Tab       colchicine (COLCRYS) 0.6 MG Tab Take 1 Tablet by mouth every day. 9 Tablet 1    fluticasone-salmeterol (ADVAIR) 250-50 MCG/ACT AEROSOL POWDER, BREATH ACTIVATED       triamcinolone acetonide (KENALOG) 0.1 % Cream Apply 1 Application topically 2 times a day. 45 g 2    Lifitegrast 5 % Solution by Ophthalmic route.      Calcium-Vitamin D (CALTRATE 600 PLUS-VIT D PO) Take  by mouth.      Multiple Vitamins-Minerals (CENTRUM SILVER ADULT 50+ PO) Take  by mouth.      Cholecalciferol (VITAMIN D PO) Take  by mouth.      aspirin (ASA) 325 MG TABS Take 325 mg by mouth every 6 hours as needed.       No current facility-administered medications for this visit.        Patient is taking medications as noted in medication list.  Current supplements as per medication list.     Allergies: Ezetimibe-simvastatin and  Vytorin    Current social contact/activities: pt states none     Is patient current with immunizations? Yes.    He  reports that he quit smoking about 21 years ago. His smoking use included cigarettes. He has a 67.50 pack-year smoking history. He has never used smokeless tobacco. He reports that he does not currently use alcohol. He reports that he does not use drugs.  Counseling given: Not Answered      ROS:    Gait: Uses a walker sometimes  Ostomy: No   Other tubes: No   Amputations: No   Chronic oxygen use Yes   Last eye exam April 2023   Wears hearing aids: No   : Reports urinary leakage during the last 6 months that has not interfered at all with their daily activities or sleep.    Screening:    Depression Screening  Little interest or pleasure in doing things?  0 - not at all  Feeling down, depressed, or hopeless? 0 - not at all  Trouble falling or staying asleep, or sleeping too much?     Feeling tired or having little energy?     Poor appetite or overeating?     Feeling bad about yourself - or that you are a failure or have let yourself or your family down?    Trouble concentrating on things, such as reading the newspaper or watching television?    Moving or speaking so slowly that other people could have noticed.  Or the opposite - being so fidgety or restless that you have been moving around a lot more than usual?     Thoughts that you would be better off dead, or of hurting yourself?     Patient Health Questionnaire Score:      If depressive symptoms identified deferred to follow up visit unless specifically addressed in assessment and plan.    Interpretation of PHQ-9 Total Score   Score Severity   1-4 No Depression   5-9 Mild Depression   10-14 Moderate Depression   15-19 Moderately Severe Depression   20-27 Severe Depression    Screening for Cognitive Impairment  Three Minute Recall (Banana, Sunrise, Chair)  1/3    Draw clock face with all 12 numbers and set the hands to show 20 past 8.  Yes    If  cognitive concerns identified, deferred for follow up unless specifically addressed in assessment and plan.    Fall Risk Assessment  Has the patient had two or more falls in the last year or any fall with injury in the last year?  No  If fall risk identified, deferred for follow up unless specifically addressed in assessment and plan.    Safety Assessment  Throw rugs on floor.  Yes  Handrails on all stairs.  Yes  Good lighting in all hallways.  Yes  Difficulty hearing.  Yes  Patient counseled about all safety risks that were identified.    Functional Assessment ADLs  Are there any barriers preventing you from cooking for yourself or meeting nutritional needs?  No.    Are there any barriers preventing you from driving safely or obtaining transportation?  No.    Are there any barriers preventing you from using a telephone or calling for help?  No.    Are there any barriers preventing you from shopping?  No.    Are there any barriers preventing you from taking care of your own finances?  No.    Are there any barriers preventing you from managing your medications?  No.    Are there any barriers preventing you from showering, bathing or dressing yourself?  No.    Are you currently engaging in any exercise or physical activity?  No.     What is your perception of your health?  Good.    Advance Care Planning  Do you have an Advance Directive, Living Will, Durable Power of , or POLST? Yes      Durable Power of    is not on file - instructed patient to bring in a copy to scan into their chart    Health Maintenance Summary            Overdue - Annual Pulmonary Function Test / Spirometry (Yearly) Overdue - never done      No completion history exists for this topic.              Overdue - IMM ZOSTER VACCINES (1 of 2) Overdue - never done      No completion history exists for this topic.              Overdue - COVID-19 Vaccine (2 - Moderna series) Overdue since 11/11/2022 11/11/2022  Imm Admin: MODERNA  BIVALENT BOOSTER SARS-COV-2 VACCINE (6+)    01/30/2022  Imm Admin: MODERNA SARS-COV-2 VACCINE (12+)              Annual Wellness Visit (Every 366 Days) Next due on 4/25/2024 04/25/2023  Level of Service: ANNUAL WELLNESS VISIT-INCLUDES PPPS SUBSEQUE*    04/25/2023  Visit Dx: Medicare annual wellness visit, subsequent    11/16/2020  Visit Dx: Medicare annual wellness visit, subsequent    11/13/2019  Visit Dx: Medicare annual wellness visit, subsequent    11/02/2018  Visit Dx: Medicare annual wellness visit, subsequent    Only the first 5 history entries have been loaded, but more history exists.              IMM DTaP/Tdap/Td Vaccine (2 - Td or Tdap) Next due on 4/28/2024 04/28/2014  Imm Admin: Tdap Vaccine              IMM PNEUMOCOCCAL VACCINE: 65+ Years (Series Information) Completed      09/30/2019  Imm Admin: Pneumococcal polysaccharide vaccine (PPSV-23)    11/09/2017  Imm Admin: Pneumococcal Conjugate Vaccine (Prevnar/PCV-13)    10/20/2011  Imm Admin: Pneumococcal Vaccine (UF) - HISTORICAL DATA    10/20/2011  Imm Admin: Pneumococcal Vaccine (UF) - HISTORICAL DATA    10/10/2008  Imm Admin: Pneumococcal polysaccharide vaccine (PPSV-23)              IMM INFLUENZA (Series Information) Completed      11/11/2022  Outside Immunization: Fluzone High-Dose Quad    01/30/2022  Imm Admin: Influenza Vaccine Adult HD    11/16/2020  Imm Admin: Influenza Vaccine Adult HD    10/01/2019  Imm Admin: Influenza Vaccine Adult HD    11/09/2016  Imm Admin: Influenza Vaccine Adult HD    Only the first 5 history entries have been loaded, but more history exists.              IMM HEP B VACCINE (Series Information) Aged Out      No completion history exists for this topic.              IMM MENINGOCOCCAL ACWY VACCINE (Series Information) Aged Out      No completion history exists for this topic.              Discontinued - COLORECTAL CANCER SCREENING  Discontinued        Frequency changed to Never automatically (Topic No Longer  "Applies)    2014  AMB REFERRAL TO GI FOR COLONOSCOPY              Discontinued - HEPATITIS C SCREENING  Discontinued      No completion history exists for this topic.                    Patient Care Team:  Salvador Caldera M.D. as PCP - General (Family Medicine)  [facility] ([facility]) as Consulting Physician  Lester Lima M.D. as Consulting Physician (Gastroenterology)  [facility] (Inactive) as Consulting Physician  [facility] (Inactive) as Consulting Physician    Social History     Tobacco Use    Smoking status: Former     Packs/day: 1.50     Years: 45.00     Pack years: 67.50     Types: Cigarettes     Quit date: 2002     Years since quittin.0    Smokeless tobacco: Never   Vaping Use    Vaping Use: Never used   Substance Use Topics    Alcohol use: Not Currently     Comment: occasional beer    Drug use: No     Family History   Problem Relation Age of Onset    Hypertension Mother     Hypertension Father      He  has a past medical history of CAD (coronary artery disease), CAD (coronary artery disease), Dementia (HCC), Diverticulitis, GI bleed, Hyperlipidemia, Hypertension, and MI, old.   History reviewed. No pertinent surgical history.    Exam:   /60 (BP Location: Left arm, Patient Position: Sitting, BP Cuff Size: Adult)   Pulse 82   Temp 36.9 °C (98.4 °F) (Temporal)   Ht 1.626 m (5' 4\")   Wt 67.6 kg (149 lb)   SpO2 94%  Body mass index is 25.58 kg/m².    Hearing excellent.    Dentition fair  Alert, oriented in no acute distress  Eye contact is good, speech goal directed, affect calm      Assessment and Plan. The following treatment and monitoring plan is recommended:      1. Medicare annual wellness visit, subsequent    2. COPD, severity to be determined (HCC)  Chronic, stable condition.  Continue to follow-up with pulmonology.  Continue with Advair and home oxygen.  3. Moderate Alzheimer's " dementia, unspecified timing of dementia onset, unspecified whether behavioral, psychotic, or mood disturbance or anxiety (HCC)  Chronic condition.  Stable at this time.  4. Essential hypertension  Chronic, stable condition.  Continue with lisinopril and amlodipine.  5. Pure hypercholesterolemia  Chronic condition.  On atorvastatin  6. Polycythemia rubra vera (HCC)  Chronic, stable condition.  Continue to follow-up with hematology.    Services suggested: No services needed at this time  Health Care Screening recommendations as per orders if indicated.  Referrals offered: PT/OT/Nutrition counseling/Behavioral Health/Smoking cessation as per orders if indicated.    Discussion today about general wellness and lifestyle habits:    Prevent falls and reduce trip hazards; Cautioned about securing or removing rugs.  Have a working fire alarm and carbon monoxide detector;   Engage in regular physical activity and social activities.     Follow-up: Return in about 3 months (around 7/25/2023).

## 2023-05-01 DIAGNOSIS — F02.B0 MODERATE ALZHEIMER'S DEMENTIA, UNSPECIFIED TIMING OF DEMENTIA ONSET, UNSPECIFIED WHETHER BEHAVIORAL, PSYCHOTIC, OR MOOD DISTURBANCE OR ANXIETY (HCC): ICD-10-CM

## 2023-05-01 DIAGNOSIS — G30.9 MODERATE ALZHEIMER'S DEMENTIA, UNSPECIFIED TIMING OF DEMENTIA ONSET, UNSPECIFIED WHETHER BEHAVIORAL, PSYCHOTIC, OR MOOD DISTURBANCE OR ANXIETY (HCC): ICD-10-CM

## 2023-05-16 ENCOUNTER — OUTPATIENT INFUSION SERVICES (OUTPATIENT)
Dept: ONCOLOGY | Facility: MEDICAL CENTER | Age: 78
End: 2023-05-16
Attending: INTERNAL MEDICINE
Payer: OTHER GOVERNMENT

## 2023-05-16 VITALS
RESPIRATION RATE: 18 BRPM | DIASTOLIC BLOOD PRESSURE: 86 MMHG | BODY MASS INDEX: 26.34 KG/M2 | SYSTOLIC BLOOD PRESSURE: 169 MMHG | HEART RATE: 100 BPM | WEIGHT: 153.44 LBS | TEMPERATURE: 96.6 F | OXYGEN SATURATION: 93 %

## 2023-05-16 DIAGNOSIS — D45 POLYCYTHEMIA VERA (HCC): ICD-10-CM

## 2023-05-16 LAB
HCT VFR BLD AUTO: 55 % (ref 42–52)
HGB BLD-MCNC: 17.9 G/DL (ref 14–18)

## 2023-05-16 PROCEDURE — 99195 PHLEBOTOMY: CPT

## 2023-05-16 PROCEDURE — 85014 HEMATOCRIT: CPT

## 2023-05-16 PROCEDURE — 85018 HEMOGLOBIN: CPT

## 2023-05-16 RX ORDER — SODIUM CHLORIDE 9 MG/ML
500 INJECTION, SOLUTION INTRAVENOUS ONCE
Status: CANCELLED | OUTPATIENT
Start: 2023-05-16 | End: 2023-05-16

## 2023-05-16 NOTE — PROGRESS NOTES
Pt is here today for TP. He has no new concerns to report.    IV to left AC. , flushed easily with good blood return. Labs drawn HGB today is 17.9 HCT is 55 he is within treatable parameters.    TP was done without any issues, pt denies any light headedness or dizziness.     Orthostatic VS remained stable.    IV was removed, gauze and tape applied.  Gauze and coban applied to the site.     Pt was discharged in stable condition.

## 2023-06-13 ENCOUNTER — OUTPATIENT INFUSION SERVICES (OUTPATIENT)
Dept: ONCOLOGY | Facility: MEDICAL CENTER | Age: 78
End: 2023-06-13
Attending: INTERNAL MEDICINE
Payer: MEDICARE

## 2023-06-13 VITALS
BODY MASS INDEX: 28.93 KG/M2 | RESPIRATION RATE: 18 BRPM | SYSTOLIC BLOOD PRESSURE: 154 MMHG | TEMPERATURE: 98.3 F | WEIGHT: 153.22 LBS | HEIGHT: 61 IN | DIASTOLIC BLOOD PRESSURE: 81 MMHG | OXYGEN SATURATION: 91 % | HEART RATE: 99 BPM

## 2023-06-13 DIAGNOSIS — D45 POLYCYTHEMIA VERA (HCC): ICD-10-CM

## 2023-06-13 LAB
HCT VFR BLD AUTO: 52 % (ref 42–52)
HGB BLD-MCNC: 17.3 G/DL (ref 14–18)

## 2023-06-13 PROCEDURE — 36415 COLL VENOUS BLD VENIPUNCTURE: CPT

## 2023-06-13 PROCEDURE — 85018 HEMOGLOBIN: CPT

## 2023-06-13 PROCEDURE — 85014 HEMATOCRIT: CPT

## 2023-06-13 RX ORDER — SODIUM CHLORIDE 9 MG/ML
500 INJECTION, SOLUTION INTRAVENOUS ONCE
Status: CANCELLED | OUTPATIENT
Start: 2023-06-13 | End: 2023-06-13

## 2023-06-14 DIAGNOSIS — D45 POLYCYTHEMIA VERA (HCC): ICD-10-CM

## 2023-06-14 NOTE — PROGRESS NOTES
Patient to [facility] for TP. PIV inserted into right wrist, flushed with + blood return, lab drawn. HGB 17.3, patient meets parameters for TP. PIV would not yield blood, after 4 more attempts appointment was rescheduled for Friday when VAT team is available. PIV removed. Patient to home with family.

## 2023-06-16 ENCOUNTER — OUTPATIENT INFUSION SERVICES (OUTPATIENT)
Dept: ONCOLOGY | Facility: MEDICAL CENTER | Age: 78
End: 2023-06-16
Attending: INTERNAL MEDICINE
Payer: MEDICARE

## 2023-06-16 VITALS
BODY MASS INDEX: 29.45 KG/M2 | OXYGEN SATURATION: 96 % | HEART RATE: 90 BPM | HEIGHT: 62 IN | WEIGHT: 160.05 LBS | SYSTOLIC BLOOD PRESSURE: 140 MMHG | RESPIRATION RATE: 18 BRPM | TEMPERATURE: 97.1 F | DIASTOLIC BLOOD PRESSURE: 82 MMHG

## 2023-06-16 DIAGNOSIS — D45 POLYCYTHEMIA VERA (HCC): ICD-10-CM

## 2023-06-16 PROCEDURE — 99195 PHLEBOTOMY: CPT

## 2023-06-16 RX ORDER — SODIUM CHLORIDE 9 MG/ML
500 INJECTION, SOLUTION INTRAVENOUS ONCE
Status: CANCELLED | OUTPATIENT
Start: 2023-06-16 | End: 2023-06-16

## 2023-06-17 NOTE — PROGRESS NOTES
Pt arrives for therapeutic phlebotomy for polycythemia.  Pt is accompanied by his spouse.  Discussed plan of care with pt.  PIV established to RUE via ultrasound guided.   Hemoglobin was 17.3 and hematocrit 52.0% on 6/13/23.  TP was not completed on 6/13/23 due to poor vein access.  Pt meets parameters for TP.  200ml of whole blood removed and PIV occluded.  New u/s guided PIV 18g was established by MITUL Oconnor.  Able to remove 100ml from this PIV site and then blood flow stopped.  3rd TP bottle connected to pt with around 10ml removed.  TP was stopped and notified Dr. Dickerson of removal of 300ml of blood only today.  Orthostatic VS are WNL.  Pt denies dizziness or feeling light headed.  PIV removed and site wrapped with pressure dressing.  Confirmed next appt time on 7/11/23 with pt.  Pt dc home in stable condition.

## 2023-07-11 ENCOUNTER — OUTPATIENT INFUSION SERVICES (OUTPATIENT)
Dept: ONCOLOGY | Facility: MEDICAL CENTER | Age: 78
End: 2023-07-11
Attending: INTERNAL MEDICINE
Payer: OTHER GOVERNMENT

## 2023-07-11 ENCOUNTER — HOSPITAL ENCOUNTER (OUTPATIENT)
Dept: RADIOLOGY | Facility: MEDICAL CENTER | Age: 78
End: 2023-07-11
Attending: INTERNAL MEDICINE

## 2023-07-11 VITALS
SYSTOLIC BLOOD PRESSURE: 148 MMHG | TEMPERATURE: 97.8 F | RESPIRATION RATE: 18 BRPM | HEART RATE: 98 BPM | HEIGHT: 62 IN | OXYGEN SATURATION: 93 % | BODY MASS INDEX: 28.16 KG/M2 | DIASTOLIC BLOOD PRESSURE: 88 MMHG | WEIGHT: 153 LBS

## 2023-07-11 DIAGNOSIS — D45 POLYCYTHEMIA VERA (HCC): ICD-10-CM

## 2023-07-11 LAB
HCT VFR BLD AUTO: 48.8 % (ref 42–52)
HGB BLD-MCNC: 16.2 G/DL (ref 14–18)

## 2023-07-11 PROCEDURE — 99195 PHLEBOTOMY: CPT

## 2023-07-11 PROCEDURE — 85018 HEMOGLOBIN: CPT

## 2023-07-11 PROCEDURE — 85014 HEMATOCRIT: CPT

## 2023-07-11 RX ORDER — SODIUM CHLORIDE 9 MG/ML
500 INJECTION, SOLUTION INTRAVENOUS ONCE
Status: CANCELLED | OUTPATIENT
Start: 2023-07-11 | End: 2023-07-11

## 2023-07-12 NOTE — PROGRESS NOTES
Alonso arrives for the therapeutic phlebotomy. US PIV placed. Labs drawn as ordered by MD.  Alonso's Hgb 16.2 , Hct 48.8 %. US PIV provided 15 cc blood, obtained 2 new PIVs, removed additional 85 cc blood. 100 cc blood removed in total. Alonso had MD appointment he had to attend. Thus, was not able to complete phlebotomy. Alonso ambulated to and from bathroom without issue. Alonso discharged home to care of wife and will see MD. Messaging MD with updates.

## 2023-07-14 ENCOUNTER — APPOINTMENT (OUTPATIENT)
Dept: ONCOLOGY | Facility: MEDICAL CENTER | Age: 78
End: 2023-07-14
Attending: INTERNAL MEDICINE
Payer: OTHER GOVERNMENT

## 2023-07-17 NOTE — PROGRESS NOTES
08/08/23    Subjective    Chief Complaint:  Follow up polycythemia    HPI:  78 male with polycythemia felt secondary to MAURICIO and COPD. Has been on monthly phlebotomies although venous access is an issue and the last phlebotomy on 7/11/23 yielded only 100 cc. He canceled this weeks' phlebotomy and apparently is on hospice for his chronic COPD. Was referred to [facility] by [facility].     ROS:    Constitutional: No weight loss  Skin: No rash or jaundice  HENT: No change in eyesight or hearing  Cardiovascular:No chest pain or arrythmia  Respiratory:No cough or SOB  GI:No nausea, vomiting, diarrhea, constipation  :No dysuria or frequency  Musculoskeletal:No bone or joint pain  Neuro:No sx's of neuropathy  Psych: No complaints    PMH:      Allergies   Allergen Reactions    Ezetimibe-Simvastatin      Other reaction(s): Unspecified  [facility], patient is allergic to Vytorin    Vytorin Unspecified     [facility], patient is allergic to Vytorin       Past Medical History:   Diagnosis Date    CAD (coronary artery disease)     MI from arterial spasms    CAD (coronary artery disease)     Dementia (HCC)     Diverticulitis     GI bleed     Hyperlipidemia     Hypertension     MI, old         History reviewed. No pertinent surgical history.     Medications:    Current Outpatient Medications on File Prior to Encounter   Medication Sig Dispense Refill    lisinopril (PRINIVIL) 40 MG tablet TAKE 1 TABLET BY MOUTH EVERY DAY 90 Tablet 3    amLODIPine (NORVASC) 5 MG Tab TAKE 1 TABLET BY MOUTH EVERY DAY 90 Tablet 3    atorvastatin (LIPITOR) 20 MG Tab       colchicine (COLCRYS) 0.6 MG Tab Take 1 Tablet by mouth every day. 9 Tablet 1    fluticasone-salmeterol (ADVAIR) 250-50 MCG/ACT AEROSOL POWDER, BREATH ACTIVATED       triamcinolone acetonide (KENALOG) 0.1 % Cream Apply 1 Application topically 2 times a day. 45 g 2    Lifitegrast 5 % Solution by Ophthalmic route.      Calcium-Vitamin D (CALTRATE 600  "PLUS-VIT D PO) Take  by mouth.      Multiple Vitamins-Minerals (CENTRUM SILVER ADULT 50+ PO) Take  by mouth.      Cholecalciferol (VITAMIN D PO) Take  by mouth.      aspirin (ASA) 325 MG TABS Take 325 mg by mouth every 6 hours as needed.       No current facility-administered medications on file prior to encounter.       Social History     Tobacco Use    Smoking status: Former     Packs/day: 1.50     Years: 45.00     Pack years: 67.50     Types: Cigarettes     Quit date: 2002     Years since quittin.3    Smokeless tobacco: Never   Substance Use Topics    Alcohol use: Not Currently     Comment: occasional beer        Family History   Problem Relation Age of Onset    Hypertension Mother     Hypertension Father         Objective    Vitals:    /80   Pulse (!) 102   Temp 37.1 °C (98.7 °F) (Temporal)   Resp 16   Ht 1.58 m (5' 2.21\")   Wt 72.4 kg (159 lb 9.8 oz)   SpO2 95%   BMI 29.00 kg/m²     Physical Exam:    Appears well-developed and well-nourished. No distress.    Head -  Normocephalic .   Eyes - Pupils are equal. Conjunctivae normal. No scleral icterus.   Ears - normal hearing  Neurological -   Alert and oriented.  Skin - Skin is warm and dry. No rash noted. Not diaphoretic. No erythema. No pallor. No jaundice   Psychiatric -  Normal mood and affect.    Labs:     Latest Reference Range & Units 16 08:36 23 15:44 23 15:45 23 14:49   Hemoglobin 14.0 - 18.0 g/dL 19.1 (H) 17.9 17.3 16.2   Hematocrit 42.0 - 52.0 % 59.1 (H) 55.0 (H) 52.0 48.8     Assessment    Imp:    Visit Diagnosis:    1. Polycythemia        2. Chronic respiratory failure with hypoxia (HCC)        3. Obstructive sleep apnea          Plan:  Discontinue therapeutic phlebotomies  See me as needed    Lester Dickerson M.D.        "

## 2023-08-08 ENCOUNTER — APPOINTMENT (OUTPATIENT)
Dept: ONCOLOGY | Facility: MEDICAL CENTER | Age: 78
End: 2023-08-08
Attending: INTERNAL MEDICINE
Payer: MEDICARE

## 2023-08-08 ENCOUNTER — HOSPITAL ENCOUNTER (OUTPATIENT)
Dept: HEMATOLOGY ONCOLOGY | Facility: MEDICAL CENTER | Age: 78
End: 2023-08-08
Attending: INTERNAL MEDICINE
Payer: MEDICARE

## 2023-08-08 VITALS
HEIGHT: 62 IN | RESPIRATION RATE: 16 BRPM | BODY MASS INDEX: 29.37 KG/M2 | DIASTOLIC BLOOD PRESSURE: 80 MMHG | HEART RATE: 102 BPM | WEIGHT: 159.61 LBS | TEMPERATURE: 98.7 F | OXYGEN SATURATION: 95 % | SYSTOLIC BLOOD PRESSURE: 122 MMHG

## 2023-08-08 DIAGNOSIS — J96.11 CHRONIC RESPIRATORY FAILURE WITH HYPOXIA (HCC): ICD-10-CM

## 2023-08-08 DIAGNOSIS — G47.33 OBSTRUCTIVE SLEEP APNEA: ICD-10-CM

## 2023-08-08 DIAGNOSIS — D75.1 POLYCYTHEMIA: ICD-10-CM

## 2023-08-08 PROCEDURE — 99213 OFFICE O/P EST LOW 20 MIN: CPT | Mod: GW | Performed by: INTERNAL MEDICINE

## 2023-08-08 PROCEDURE — 99212 OFFICE O/P EST SF 10 MIN: CPT | Performed by: INTERNAL MEDICINE

## 2023-08-08 ASSESSMENT — PAIN SCALES - GENERAL: PAINLEVEL: NO PAIN

## 2023-08-08 NOTE — ADDENDUM NOTE
Encounter addended by: Denisse Arevalo, Med Ass't on: 8/8/2023 2:53 PM   Actions taken: Charge Capture section accepted, Chief Complaint modified

## 2023-08-23 ENCOUNTER — TELEPHONE (OUTPATIENT)
Dept: HEALTH INFORMATION MANAGEMENT | Facility: OTHER | Age: 78
End: 2023-08-23

## 2023-10-24 DIAGNOSIS — I10 ESSENTIAL HYPERTENSION: ICD-10-CM

## 2023-10-25 DIAGNOSIS — I10 ESSENTIAL HYPERTENSION: ICD-10-CM

## 2023-10-25 RX ORDER — AMLODIPINE BESYLATE 5 MG/1
5 TABLET ORAL DAILY
Qty: 90 TABLET | Refills: 0 | Status: SHIPPED | OUTPATIENT
Start: 2023-10-25

## 2023-11-29 ENCOUNTER — PATIENT MESSAGE (OUTPATIENT)
Dept: HEALTH INFORMATION MANAGEMENT | Facility: OTHER | Age: 78
End: 2023-11-29

## 2024-01-02 DIAGNOSIS — I10 ESSENTIAL HYPERTENSION: ICD-10-CM

## 2024-01-02 RX ORDER — LISINOPRIL 40 MG/1
40 TABLET ORAL
Qty: 90 TABLET | Refills: 0 | Status: SHIPPED | OUTPATIENT
Start: 2024-01-02